# Patient Record
Sex: FEMALE | Race: WHITE | NOT HISPANIC OR LATINO | Employment: FULL TIME | URBAN - METROPOLITAN AREA
[De-identification: names, ages, dates, MRNs, and addresses within clinical notes are randomized per-mention and may not be internally consistent; named-entity substitution may affect disease eponyms.]

---

## 2018-07-02 ENCOUNTER — OFFICE VISIT (OUTPATIENT)
Dept: UROLOGY | Facility: CLINIC | Age: 28
End: 2018-07-02
Payer: MEDICAID

## 2018-07-02 ENCOUNTER — TELEPHONE (OUTPATIENT)
Dept: UROLOGY | Facility: CLINIC | Age: 28
End: 2018-07-02

## 2018-07-02 DIAGNOSIS — R10.9 BILATERAL FLANK PAIN: ICD-10-CM

## 2018-07-02 DIAGNOSIS — R10.2 PELVIC PAIN IN FEMALE: ICD-10-CM

## 2018-07-02 DIAGNOSIS — E83.59 NEPHROCALCINOSIS: ICD-10-CM

## 2018-07-02 DIAGNOSIS — N20.0 KIDNEY STONES: Primary | ICD-10-CM

## 2018-07-02 DIAGNOSIS — N29 NEPHROCALCINOSIS: ICD-10-CM

## 2018-07-02 LAB
BILIRUB SERPL-MCNC: NORMAL MG/DL
BLOOD URINE, POC: 5
COLOR, POC UA: YELLOW
GLUCOSE UR QL STRIP: NORMAL
KETONES UR QL STRIP: NORMAL
LEUKOCYTE ESTERASE URINE, POC: NORMAL
NITRITE, POC UA: NORMAL
PH, POC UA: 6
POC RESIDUAL URINE VOLUME: 0 ML (ref 0–100)
PROTEIN, POC: NORMAL
SPECIFIC GRAVITY, POC UA: 1005
UROBILINOGEN, POC UA: NORMAL

## 2018-07-02 PROCEDURE — 99213 OFFICE O/P EST LOW 20 MIN: CPT | Mod: PBBFAC,25 | Performed by: NURSE PRACTITIONER

## 2018-07-02 PROCEDURE — 51798 US URINE CAPACITY MEASURE: CPT | Mod: PBBFAC | Performed by: NURSE PRACTITIONER

## 2018-07-02 PROCEDURE — 99214 OFFICE O/P EST MOD 30 MIN: CPT | Mod: S$PBB,,, | Performed by: NURSE PRACTITIONER

## 2018-07-02 PROCEDURE — 99999 PR PBB SHADOW E&M-EST. PATIENT-LVL III: CPT | Mod: PBBFAC,,, | Performed by: NURSE PRACTITIONER

## 2018-07-02 PROCEDURE — 87086 URINE CULTURE/COLONY COUNT: CPT

## 2018-07-02 PROCEDURE — 81001 URINALYSIS AUTO W/SCOPE: CPT | Mod: PBBFAC | Performed by: NURSE PRACTITIONER

## 2018-07-02 RX ORDER — OXYCODONE AND ACETAMINOPHEN 5; 325 MG/1; MG/1
1 TABLET ORAL EVERY 6 HOURS PRN
Qty: 15 TABLET | Refills: 0 | Status: SHIPPED | OUTPATIENT
Start: 2018-07-02 | End: 2022-02-02

## 2018-07-02 NOTE — TELEPHONE ENCOUNTER
----- Message from Sari Ayala sent at 7/2/2018  1:12 PM CDT -----  Contact: [phone]  Pt is requesting to speak to you , pt would not say what this is regarding . Pls call pt [phone]. Thanks.......Sapna

## 2018-07-02 NOTE — PROGRESS NOTES
Subjective:       Patient ID: Eli Car is a 28 y.o. female who is an established patient of Dr. Jarvis though new to me was last seen in this office 6/7/16    Chief Complaint:   No chief complaint on file.    Bilateral flank pain  Patient complains of bilateral flank pain with radiation to the abdomen. Onset of symptoms was abrupt starting 2 weeks ago with gradually worsening course since that time. Patient describes the pain as sharp and stabbing, continuous and rated as moderate. Pain is usually worse at night and aggravated with increased movement. Pain alleviated by decrease in activity. She also reports suprapubic pressure only with urination. Denies straining to void. She reports issues with constipation--currently taking Linzess as needed. The patient has had no nausea/no vomiting and no diaphoresis. There has been no fever or chills. The patient is not complaining of dysuria, gross hematuria, or frequency. Risk factors for urolithiasis: history of stone disease.    She has history of kidney stones. She has had ESWL in 2002 and left ureteroscopy in 2015. She has medullary nephrocalcinosis. Previously prescribed Urocit-K--self stopped this medication     PVR (bladder scan) today - 0 ml    ACTIVE MEDICAL ISSUES:  Patient Active Problem List   Diagnosis    Body mass index between 19-24, adult    Low back pain    Abnormal urinalysis    Sciatica    Pelvic pain in female    Calculus of kidney    Nephrocalcinosis       ALLERGIES AND MEDICATIONS: updated and reviewed.  Review of patient's allergies indicates:   Allergen Reactions    Adhesive Itching and Swelling     Causes itching and redness    Demerol [meperidine] Itching     Current Outpatient Prescriptions   Medication Sig    oxyCODONE-acetaminophen (PERCOCET) 5-325 mg per tablet Take 1 tablet by mouth every 6 (six) hours as needed for Pain.    potassium citrate (UROCIT-K) 10 mEq (1,080 mg) TbSR Take 1 tablet (10 mEq total) by mouth 3 (three)  times daily with meals.     No current facility-administered medications for this visit.        Review of Systems   Constitutional: Negative for activity change, chills, fatigue, fever and unexpected weight change.   Eyes: Negative for discharge, redness and visual disturbance.   Respiratory: Negative for cough, shortness of breath and wheezing.    Cardiovascular: Negative for chest pain and leg swelling.   Gastrointestinal: Negative for abdominal distention, abdominal pain, constipation, diarrhea, nausea and vomiting.   Genitourinary: Positive for flank pain (bilateral) and pelvic pain. Negative for decreased urine volume, difficulty urinating, dysuria, frequency, hematuria, urgency, vaginal bleeding and vaginal discharge.   Musculoskeletal: Negative for arthralgias, joint swelling and myalgias.   Skin: Negative for color change and rash.   Neurological: Negative for dizziness and light-headedness.   Psychiatric/Behavioral: Negative for behavioral problems and confusion. The patient is not nervous/anxious.        Objective:      There were no vitals filed for this visit.  Physical Exam   Constitutional: She is oriented to person, place, and time. She appears well-developed.   HENT:   Head: Normocephalic and atraumatic.   Nose: Nose normal.   Eyes: Conjunctivae are normal. Right eye exhibits no discharge. Left eye exhibits no discharge.   Neck: Normal range of motion. Neck supple. No tracheal deviation present. No thyromegaly present.   Cardiovascular: Normal rate and regular rhythm.    Pulmonary/Chest: Effort normal. No respiratory distress. She has no wheezes.   Abdominal: Soft. She exhibits no distension. There is no hepatosplenomegaly. There is no tenderness. There is CVA tenderness (minimal bilateral). No hernia.   Genitourinary:   Genitourinary Comments: Patient declined exam   Musculoskeletal: Normal range of motion. She exhibits no edema.   Neurological: She is alert and oriented to person, place, and time.    Skin: Skin is warm and dry. No rash noted. No erythema.     Psychiatric: She has a normal mood and affect. Her behavior is normal. Judgment normal.       Urine dipstick shows ++LE, trace protein,5-10 RBCs .      Assessment:       1. Kidney stones    2. Nephrocalcinosis    3. Bilateral flank pain    4. Pelvic pain in female          Plan:       1. Kidney stones  -s/p ESWL in 2002 and L URS in 2015  -Now with bilateral flank pain  -Will get CT renal stone study  - POCT urinalysis, dipstick or tablet reag    2. Nephrocalcinosis  -Previously on Urocit K--self stopped medication  -Recommend resume Urocit K--declined at this time    3. Bilateral flank pain  - Urine culture  - CT Renal Stone Study ABD Pelvis WO; Future  - oxyCODONE-acetaminophen (PERCOCET) 5-325 mg per tablet; Take 1 tablet by mouth every 6 (six) hours as needed for Pain.  Dispense: 15 tablet; Refill: 0    4. Pelvic pain in female  -Acceptable PVR  - POCT Bladder Scan  - Urine culture            Follow-up for 7-10 days to review CT scan.

## 2018-07-03 ENCOUNTER — HOSPITAL ENCOUNTER (OUTPATIENT)
Dept: RADIOLOGY | Facility: HOSPITAL | Age: 28
Discharge: HOME | End: 2018-07-03
Attending: NURSE PRACTITIONER
Payer: MEDICAID

## 2018-07-03 DIAGNOSIS — R31.9 HEMATURIA, UNSPECIFIED TYPE: Primary | ICD-10-CM

## 2018-07-03 DIAGNOSIS — R10.9 BILATERAL FLANK PAIN: ICD-10-CM

## 2018-07-03 DIAGNOSIS — N20.0 KIDNEY STONES: Primary | ICD-10-CM

## 2018-07-03 PROCEDURE — 74176 CT ABD & PELVIS W/O CONTRAST: CPT | Mod: 26,,, | Performed by: RADIOLOGY

## 2018-07-03 PROCEDURE — 74176 CT ABD & PELVIS W/O CONTRAST: CPT | Mod: TC

## 2018-07-04 LAB — BACTERIA UR CULT: NORMAL

## 2018-07-05 ENCOUNTER — TELEPHONE (OUTPATIENT)
Dept: UROLOGY | Facility: CLINIC | Age: 28
End: 2018-07-05

## 2018-10-19 ENCOUNTER — HOSPITAL ENCOUNTER (EMERGENCY)
Facility: HOSPITAL | Age: 28
Discharge: HOME | End: 2018-10-19
Attending: EMERGENCY MEDICINE
Payer: MEDICAID

## 2018-10-19 VITALS
SYSTOLIC BLOOD PRESSURE: 115 MMHG | BODY MASS INDEX: 22.2 KG/M2 | HEART RATE: 85 BPM | OXYGEN SATURATION: 97 % | HEIGHT: 64 IN | DIASTOLIC BLOOD PRESSURE: 81 MMHG | WEIGHT: 130 LBS | TEMPERATURE: 99 F | RESPIRATION RATE: 16 BRPM

## 2018-10-19 DIAGNOSIS — M79.652 LEFT THIGH PAIN: ICD-10-CM

## 2018-10-19 DIAGNOSIS — S16.1XXA NECK STRAIN, INITIAL ENCOUNTER: ICD-10-CM

## 2018-10-19 DIAGNOSIS — R52 PAIN: ICD-10-CM

## 2018-10-19 DIAGNOSIS — M25.532 LEFT WRIST PAIN: ICD-10-CM

## 2018-10-19 DIAGNOSIS — S00.531A CONTUSION OF LIP, INITIAL ENCOUNTER: Primary | ICD-10-CM

## 2018-10-19 LAB
B-HCG UR QL: NEGATIVE
BACTERIA #/AREA URNS HPF: ABNORMAL /HPF
BILIRUB UR QL STRIP: NEGATIVE
CLARITY UR: CLEAR
COLOR UR: YELLOW
CTP QC/QA: YES
GLUCOSE UR QL STRIP: NEGATIVE
HGB UR QL STRIP: ABNORMAL
KETONES UR QL STRIP: NEGATIVE
LEUKOCYTE ESTERASE UR QL STRIP: ABNORMAL
MICROSCOPIC COMMENT: ABNORMAL
NITRITE UR QL STRIP: NEGATIVE
PH UR STRIP: 7 [PH] (ref 5–8)
PROT UR QL STRIP: NEGATIVE
RBC #/AREA URNS HPF: 0 /HPF (ref 0–4)
SP GR UR STRIP: 1 (ref 1–1.03)
SQUAMOUS #/AREA URNS HPF: 3 /HPF
URN SPEC COLLECT METH UR: ABNORMAL
UROBILINOGEN UR STRIP-ACNC: NEGATIVE EU/DL
WBC #/AREA URNS HPF: 8 /HPF (ref 0–5)

## 2018-10-19 PROCEDURE — 99284 EMERGENCY DEPT VISIT MOD MDM: CPT | Mod: 25

## 2018-10-19 PROCEDURE — 96372 THER/PROPH/DIAG INJ SC/IM: CPT

## 2018-10-19 PROCEDURE — 87086 URINE CULTURE/COLONY COUNT: CPT

## 2018-10-19 PROCEDURE — 63600175 PHARM REV CODE 636 W HCPCS: Performed by: PHYSICIAN ASSISTANT

## 2018-10-19 PROCEDURE — 81025 URINE PREGNANCY TEST: CPT | Performed by: NURSE PRACTITIONER

## 2018-10-19 PROCEDURE — 81000 URINALYSIS NONAUTO W/SCOPE: CPT

## 2018-10-19 RX ORDER — IBUPROFEN 200 MG
200 TABLET ORAL EVERY 6 HOURS PRN
COMMUNITY
End: 2018-11-02 | Stop reason: ALTCHOICE

## 2018-10-19 RX ORDER — IBUPROFEN 600 MG/1
600 TABLET ORAL EVERY 6 HOURS PRN
Qty: 20 TABLET | Refills: 0 | Status: SHIPPED | OUTPATIENT
Start: 2018-10-19 | End: 2018-11-02 | Stop reason: ALTCHOICE

## 2018-10-19 RX ORDER — KETOROLAC TROMETHAMINE 30 MG/ML
15 INJECTION, SOLUTION INTRAMUSCULAR; INTRAVENOUS
Status: COMPLETED | OUTPATIENT
Start: 2018-10-19 | End: 2018-10-19

## 2018-10-19 RX ADMIN — KETOROLAC TROMETHAMINE 15 MG: 30 INJECTION, SOLUTION INTRAMUSCULAR at 01:10

## 2018-10-19 NOTE — ED TRIAGE NOTES
C/o pain to her left hip and left wrist. Pt. Reports she hit her lip. Pt. Reports pain to her neck area. Denies any LOC. Denies any back pain. Pt. Is able to demonstrate ROM to her upper and lower body. Pt. Walks without any assistance.

## 2018-10-19 NOTE — ED PROVIDER NOTES
"Encounter Date: 10/19/2018    This is a SORT/MSE of a 28 y.o. female presenting to the ED with c/o left wrist, neck, and leg pain following a slip and fall 2 days ago. Also reports flank/side pain. Care will be transferred to an alternate provider when patient is roomed for a full evaluation and final disposition. STEPHANIE Su, FNP-C 10/19/2018 1:20 PM    SCRIBE #1 NOTE: INaila, anabella scribing for, and in the presence of,  Jesus Valverde PA-C. I have scribed the following portions of the note - Other sections scribed: HPI and ROS.       History     Chief Complaint   Patient presents with    Fall     Pt reports slipa nd fall Tuesday night at [employer]. Complains of left wrist pain, neck pain, left hip, and right flank pain. Denies LOC.      CC: Fall    HPI: This 28 y.o. Female, with a medical history of acid reflux and kidney stone, presents to the ED s/p a mechanical fall that occurred on Tuesday (10/16/18). Pt reports that during the fall she landed on her left side, hitting her lip, twisting her left wrist and breaking the fall with her left hip. She states that she has since been experiencing bruising to the lip (improving), left hip pain, left wrist pain ("pops" when twisting the wrist or turning a steering wheel) and neck stiffness (when turning the head to the right). Symptoms are acute, constant and moderate (5/10). Pt additionally notes experiencing a "pins and needle" sensation to the left foot last night, but notes that it has since resolved. Pt denies loss of consciousness, weakness and back pain. No other associated symptoms. No alleviating factors.           The history is provided by the patient. No  was used.     Review of patient's allergies indicates:   Allergen Reactions    Adhesive Itching and Swelling     Causes itching and redness    Demerol [meperidine] Itching     Past Medical History:   Diagnosis Date    Acid reflux     Constipation     Depression     " Kidney stone     Vaginal delivery     x2     Past Surgical History:   Procedure Laterality Date    APPENDECTOMY      CYSTOSCOPY, RETROGRADE, URETEROSCOPY, STENT PLACEMENT, laser lithotripsy,RUGGIERO CATHETER PLACEMENT Left 1/5/2015    [facility] at [facility]    KIDNEY STONE SURGERY      LAPAROSCOPY-DIAGNOSTIC N/A 6/19/2014    Performed by [facility]man [facility] at [facility] OR    OVARIAN CYST REMOVAL  2011    TONSILLECTOMY       Family History   Problem Relation Age of Onset    Breast cancer Maternal Grandmother      Social History     Tobacco Use    Smoking status: Never Smoker    Smokeless tobacco: Never Used   Substance Use Topics    Alcohol use: No    Drug use: No     Review of Systems   Constitutional: Negative for fever.   HENT: Negative for sore throat.         (+) bruising to left lip   Respiratory: Negative for shortness of breath.    Cardiovascular: Negative for chest pain.   Gastrointestinal: Negative for nausea.   Genitourinary: Negative for dysuria.   Musculoskeletal: Positive for arthralgias (left; left wrist) and neck stiffness. Negative for back pain.   Skin: Negative for rash.   Neurological: Negative for weakness.       Physical Exam     Initial Vitals [10/19/18 1320]   BP Pulse Resp Temp SpO2   115/81 85 16 98.9 °F (37.2 °C) 97 %      MAP       --         Physical Exam    Vitals reviewed.  Constitutional: She appears well-developed and well-nourished. She is not diaphoretic. No distress.   HENT:   Head: Normocephalic and atraumatic.   Right Ear: External ear normal.   Left Ear: External ear normal.   Nose: Nose normal.   Mouth/Throat: Oropharynx is clear and moist. No oropharyngeal exudate.   Eyes: Conjunctivae are normal. No scleral icterus.   Neck: Normal range of motion and full passive range of motion without pain. Neck supple. Muscular tenderness present. No spinous process tenderness present.   Cardiovascular: Normal rate, regular rhythm, normal heart sounds and intact  distal pulses.   Pulmonary/Chest: Breath sounds normal. No respiratory distress. She has no wheezes. She has no rhonchi. She has no rales. She exhibits no tenderness.   Abdominal: Soft. She exhibits no distension. There is no tenderness. There is no rebound, no guarding and no CVA tenderness.   Musculoskeletal: Normal range of motion. She exhibits tenderness.   Minimal tenderness to posterior neck, greatest on the right paraspinal region.  Mild tenderness to the ulnar aspect of the left wrist with no deformity or decreased range of motion. No tenderness of the left hip or knee.  Minimal tenderness to the lateral left thigh with no contusions or hematomas.  Patient ambulates with no limp.   Neurological: She is alert and oriented to person, place, and time.   Skin: Skin is warm and dry. No erythema.         ED Course   Procedures  Labs Reviewed   URINALYSIS, REFLEX TO URINE CULTURE - Abnormal; Notable for the following components:       Result Value    Occult Blood UA 1+ (*)     Leukocytes, UA 3+ (*)     All other components within normal limits    Narrative:     Preferred Collection Type->Urine, Clean Catch   URINALYSIS MICROSCOPIC - Abnormal; Notable for the following components:    WBC, UA 8 (*)     Bacteria, UA Moderate (*)     All other components within normal limits    Narrative:     Preferred Collection Type->Urine, Clean Catch   CULTURE, URINE   POCT URINE PREGNANCY          Imaging Results          X-Ray Wrist Complete Left (Final result)  Result time 10/19/18 14:20:37    Final result by Ti Rahman MD (10/19/18 14:20:37)                 Impression:      1. No acute displaced fracture or dislocation of the wrist.      Electronically signed by: Ti Rahman MD  Date:    10/19/2018  Time:    14:20             Narrative:    EXAMINATION:  XR WRIST COMPLETE 3 VIEWS LEFT    CLINICAL HISTORY:  Pain, unspecified    TECHNIQUE:  PA, lateral, and oblique views of the left wrist were  performed.    COMPARISON:  None    FINDINGS:  Three views.    No acute displaced fracture or dislocation of the wrist.  No radiopaque foreign body.                               X-Ray Cervical Spine AP And Lateral (Final result)  Result time 10/19/18 14:21:44    Final result by Ti Rahman MD (10/19/18 14:21:44)                 Impression:      1. No acute displaced fracture or dislocation of the cervical spine.      Electronically signed by: Ti Rahman MD  Date:    10/19/2018  Time:    14:21             Narrative:    EXAMINATION:  XR CERVICAL SPINE AP LATERAL    CLINICAL HISTORY:  Pain, unspecified    TECHNIQUE:  AP, lateral and open mouth views of the cervical spine were performed.    COMPARISON:  None.    FINDINGS:  Four views.    Lateral imaging demonstrates adequate alignment of the cervical spine without significant vertebral body height loss or disc space height loss.  The facet joints are aligned.  AP spinal alignment is grossly unremarkable.  The lung apices are grossly clear.  The odontoid is intact.  The lateral masses of C1 are in anatomic relationship with C2.  The airway is patent.                              X-Rays:   Independently Interpreted Readings:   Other Readings:  X-rays of C-spine and left wrist negative for fracture dislocation    Medical Decision Making:   ED Management:  28-year-old female with multiple complaints from mechanical fall 3 days ago.  No exam findings to suggest serious injury.  X-rays negative. Patient reports chronic right flank pain, with no urinary symptoms. UA without convincing evidence for infection given clinical picture.  Will send for culture and follow-up.  Patient overall well-appearing with no abnormality in vital signs. Treated with NSAID and discharged with follow-up instructions.            Scribe Attestation:   Scribe #1: I performed the above scribed service and the documentation accurately describes the services I performed. I attest to the  accuracy of the note.    Attending Attestation:           Physician Attestation for Scribe:  Physician Attestation Statement for Scribe #1: I, Jesus Valverde PA-C, reviewed documentation, as scribed by Naila Flanagan in my presence, and it is both accurate and complete.                    Clinical Impression:   The primary encounter diagnosis was Contusion of lip, initial encounter. Diagnoses of Pain, Neck strain, initial encounter, Left wrist pain, and Left thigh pain were also pertinent to this visit.                             Jesus Valverde PA-C  10/19/18 1442

## 2018-10-21 LAB — BACTERIA UR CULT: NORMAL

## 2018-11-02 ENCOUNTER — HOSPITAL ENCOUNTER (EMERGENCY)
Facility: HOSPITAL | Age: 28
Discharge: HOME | End: 2018-11-02
Attending: EMERGENCY MEDICINE
Payer: MEDICAID

## 2018-11-02 VITALS
HEART RATE: 88 BPM | RESPIRATION RATE: 20 BRPM | BODY MASS INDEX: 18.78 KG/M2 | HEIGHT: 64 IN | WEIGHT: 110 LBS | SYSTOLIC BLOOD PRESSURE: 117 MMHG | OXYGEN SATURATION: 97 % | DIASTOLIC BLOOD PRESSURE: 77 MMHG | TEMPERATURE: 99 F

## 2018-11-02 DIAGNOSIS — S62.609A CLOSED FRACTURE DISLOCATION OF DISTAL INTERPHALANGEAL (DIP) JOINT OF FINGER, INITIAL ENCOUNTER: Primary | ICD-10-CM

## 2018-11-02 LAB
B-HCG UR QL: NEGATIVE
CTP QC/QA: YES

## 2018-11-02 PROCEDURE — 26755 TREAT FINGER FRACTURE EACH: CPT

## 2018-11-02 PROCEDURE — 25000003 PHARM REV CODE 250: Performed by: NURSE PRACTITIONER

## 2018-11-02 PROCEDURE — 99283 EMERGENCY DEPT VISIT LOW MDM: CPT

## 2018-11-02 PROCEDURE — 81025 URINE PREGNANCY TEST: CPT | Performed by: PHYSICIAN ASSISTANT

## 2018-11-02 PROCEDURE — 29130 APPL FINGER SPLINT STATIC: CPT | Mod: F1

## 2018-11-02 RX ORDER — NAPROXEN 500 MG/1
500 TABLET ORAL 2 TIMES DAILY PRN
Qty: 15 TABLET | Refills: 0 | OUTPATIENT
Start: 2018-11-02 | End: 2021-12-23

## 2018-11-02 RX ORDER — LIDOCAINE HYDROCHLORIDE 20 MG/ML
10 INJECTION, SOLUTION INFILTRATION; PERINEURAL
Status: COMPLETED | OUTPATIENT
Start: 2018-11-02 | End: 2018-11-02

## 2018-11-02 RX ORDER — OXYCODONE AND ACETAMINOPHEN 5; 325 MG/1; MG/1
1 TABLET ORAL
Status: DISCONTINUED | OUTPATIENT
Start: 2018-11-02 | End: 2018-11-02

## 2018-11-02 RX ORDER — KETOROLAC TROMETHAMINE 30 MG/ML
30 INJECTION, SOLUTION INTRAMUSCULAR; INTRAVENOUS
Status: DISCONTINUED | OUTPATIENT
Start: 2018-11-02 | End: 2018-11-02 | Stop reason: HOSPADM

## 2018-11-02 RX ADMIN — LIDOCAINE HYDROCHLORIDE 10 ML: 20 INJECTION, SOLUTION INFILTRATION; PERINEURAL at 02:11
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
## 2018-11-02 NOTE — DISCHARGE INSTRUCTIONS
Follow-up with Orthopedics as discussed for further treatment.  Take pain medication as needed and only as prescribed.    If you would like to follow up with the [facility] for further care of your fracture, please call the [facility] Scheduling Department at [phone] during business hours.  Please let the  know you need a fracture follow-up appointment with Orthopedics, and you will be scheduled in the Orthopedic Clinic.  Please bring your original Emergency Department discharge papers with  you to the clinic appointment.

## 2018-11-02 NOTE — ED NOTES
Made pt. Aware of need for urine sample so that medication can be given. Pt. States she is not going to give urine. Question if we can use sample from urine that was given on her last visit. Pt. Made aware that a new sample is given. Pt. States she is not going to give urine sample. Provider made aware.  Pt. Also, reports she drove to ED and cannot get anyone to answer phone. Pt. Reports she cannot find someone to come to the ED to pick her up

## 2018-11-02 NOTE — ED TRIAGE NOTES
Patient presents to the ED via personal transportation with children. Patient reports pain to left 5th digit. Patient also reports numbness to to finger. Patient states that she does not want to explain how she injured the finger in front of her children.

## 2018-11-02 NOTE — ED NOTES
Pt refused to   Give urine for UPT.Stated that she was on birth control.Explained to pt that she would not be given any medicine without UPT in the chart.

## 2019-03-21 ENCOUNTER — HOSPITAL ENCOUNTER (EMERGENCY)
Facility: HOSPITAL | Age: 29
Discharge: HOME | End: 2019-03-21
Attending: EMERGENCY MEDICINE
Payer: MEDICAID

## 2019-03-21 VITALS
OXYGEN SATURATION: 99 % | TEMPERATURE: 99 F | DIASTOLIC BLOOD PRESSURE: 75 MMHG | HEIGHT: 64 IN | HEART RATE: 102 BPM | WEIGHT: 130 LBS | RESPIRATION RATE: 17 BRPM | BODY MASS INDEX: 22.2 KG/M2 | SYSTOLIC BLOOD PRESSURE: 118 MMHG

## 2019-03-21 DIAGNOSIS — R55 SYNCOPE: ICD-10-CM

## 2019-03-21 LAB
AMPHET+METHAMPHET UR QL: NEGATIVE
B-HCG UR QL: NEGATIVE
BARBITURATES UR QL SCN>200 NG/ML: NEGATIVE
BENZODIAZ UR QL SCN>200 NG/ML: NEGATIVE
BZE UR QL SCN: NEGATIVE
CANNABINOIDS UR QL SCN: NEGATIVE
CREAT UR-MCNC: 104.4 MG/DL
CTP QC/QA: YES
METHADONE UR QL SCN>300 NG/ML: NEGATIVE
OPIATES UR QL SCN: NEGATIVE
PCP UR QL SCN>25 NG/ML: NEGATIVE
TOXICOLOGY INFORMATION: NORMAL

## 2019-03-21 PROCEDURE — 25000003 PHARM REV CODE 250: Performed by: EMERGENCY MEDICINE

## 2019-03-21 PROCEDURE — 93005 ELECTROCARDIOGRAM TRACING: CPT

## 2019-03-21 PROCEDURE — 99284 EMERGENCY DEPT VISIT MOD MDM: CPT | Mod: 25

## 2019-03-21 PROCEDURE — 82962 GLUCOSE BLOOD TEST: CPT

## 2019-03-21 PROCEDURE — 81025 URINE PREGNANCY TEST: CPT | Performed by: EMERGENCY MEDICINE

## 2019-03-21 PROCEDURE — 93010 ELECTROCARDIOGRAM REPORT: CPT | Mod: ,,, | Performed by: INTERNAL MEDICINE

## 2019-03-21 PROCEDURE — 80307 DRUG TEST PRSMV CHEM ANLYZR: CPT

## 2019-03-21 PROCEDURE — 93010 EKG 12-LEAD: ICD-10-PCS | Mod: ,,, | Performed by: INTERNAL MEDICINE

## 2019-03-21 RX ORDER — BUTALBITAL, ACETAMINOPHEN AND CAFFEINE 50; 325; 40 MG/1; MG/1; MG/1
1 TABLET ORAL
Status: COMPLETED | OUTPATIENT
Start: 2019-03-21 | End: 2019-03-21

## 2019-03-21 RX ORDER — ONDANSETRON 8 MG/1
8 TABLET, ORALLY DISINTEGRATING ORAL
Status: DISCONTINUED | OUTPATIENT
Start: 2019-03-21 | End: 2019-03-21

## 2019-03-21 RX ADMIN — BUTALBITAL, ACETAMINOPHEN AND CAFFEINE 1 TABLET: 50; 325; 40 TABLET ORAL at 03:03

## 2019-03-21 NOTE — ED PROVIDER NOTES
Encounter Date: 3/21/2019    SCRIBE #1 NOTE: I, Lilly Nicole, am scribing for, and in the presence of,  Cristal Hairston MD. I have scribed the following portions of the note - Other sections scribed: HPI, ROS, PE.       History     Chief Complaint   Patient presents with    Head Injury     Pt had syncope yesterday hitting back of head.  did not seek medical attn.  today woke up with headache and vomiting.   Went to Urgent Care, they sent here for further eval.     Pt presents with HA all over, nausea and vomiting.  denies diarrhea or fever.  Pt A&O x 4.     CC: Head Injury    HPI: This is a 28 y.o. F who has Depression, and Acid reflux who presents to the ED via EMS transportation for emergent evaluation of headache to the posterior aspect of the head after falling and hitting the back of her head during a syncopal episode yesterday. Pt reports chills and sweaty feet as she was at the refrigerator prior to the syncopal episode yesterday. Pt has associated right sided post neck pain, nausea, vomiting. She was evaluated at Urgent care PTA, in which she was administered Zofran by mouth and was unable to tolerate the medication. EMS personnel administered Zofran by IV en route. Pt does not have a Hx of similar problem, nor does she have a Hx of seizures. Additionally, the pt has the Nexplanon, which is scheduled for removal next month. Pt is not breastfeeding. Pt denies gait problem, visual disturbance, or tobacco use.      The history is provided by the patient. No  was used.     Review of patient's allergies indicates:   Allergen Reactions    Adhesive Itching and Swelling     Causes itching and redness    Demerol [meperidine] Itching     Past Medical History:   Diagnosis Date    Acid reflux     Constipation     Depression     Kidney stone     Vaginal delivery     x2     Past Surgical History:   Procedure Laterality Date    APPENDECTOMY      CYSTOSCOPY, RETROGRADE, URETEROSCOPY, STENT  PLACEMENT, laser lithotripsy,RUGGIERO CATHETER PLACEMENT Left 1/5/2015    Performed by SILVA Jarvis MD at [facility] OR    KIDNEY STONE SURGERY      LAPAROSCOPY-DIAGNOSTIC N/A 6/19/2014    Performed by Joshua Eastman MD at [facility] OR    OVARIAN CYST REMOVAL  2011    TONSILLECTOMY       Family History   Problem Relation Age of Onset    Breast cancer Maternal Grandmother      Social History     Tobacco Use    Smoking status: Never Smoker    Smokeless tobacco: Never Used   Substance Use Topics    Alcohol use: No    Drug use: No     Review of Systems   Constitutional: Negative for chills and fever.   HENT: Negative for ear pain and sore throat.    Eyes: Positive for photophobia. Negative for pain and visual disturbance.   Respiratory: Negative for cough and shortness of breath.    Cardiovascular: Negative for chest pain.   Gastrointestinal: Positive for nausea and vomiting. Negative for abdominal pain and diarrhea.   Genitourinary: Negative for dysuria.   Musculoskeletal: Positive for neck pain (right sided). Negative for back pain and gait problem.   Skin: Negative for rash.   Neurological: Positive for syncope and headaches.       Physical Exam     Initial Vitals [03/21/19 1336]   BP Pulse Resp Temp SpO2   126/66 86 18 98.5 °F (36.9 °C) 99 %      MAP       --         Physical Exam    Nursing note and vitals reviewed.  Constitutional: She appears well-developed and well-nourished. She is not diaphoretic. No distress.   HENT:   Head: Normocephalic and atraumatic.   Eyes: EOM are normal.   Neck: Neck supple.   (+) Pain with ROM on the right side of the neck   Cardiovascular: Normal rate and regular rhythm.   Pulmonary/Chest: Breath sounds normal. No respiratory distress. She has no wheezes. She has no rhonchi. She has no rales. She exhibits no tenderness.   Abdominal: Soft. Normal appearance and bowel sounds are normal. She exhibits no distension. There is no tenderness. There is no rebound and no guarding.    Musculoskeletal: Normal range of motion. She exhibits no edema.   Neurological: She is alert and oriented to person, place, and time. She has normal strength. No cranial nerve deficit or sensory deficit. GCS score is 15. GCS eye subscore is 4. GCS verbal subscore is 5. GCS motor subscore is 6.   Cranial nerves 2-12 intact. Motor strength sensation intact all extremities. Normal finger-to-nose.  Normal heel-to-shin.   Skin: Skin is warm and dry.   Psychiatric: She has a normal mood and affect.         ED Course   Procedures  Labs Reviewed   DRUG SCREEN PANEL, URINE EMERGENCY   POCT URINE PREGNANCY   POCT GLUCOSE MONITORING CONTINUOUS     EKG Readings: (Independently Interpreted)   Normal sinus rhythm. No hocm brugada WPW or prolonged QT.  No stemi       Imaging Results    None                     Scribe Attestation:   Scribe #1: I performed the above scribed service and the documentation accurately describes the services I performed. I attest to the accuracy of the note.    Attending Attestation:   Physician Attestation Statement for Resident:  As the supervising MD . -: Patient is here for syncope yesterday.  Likely vasovagal.  No hocm brugada WPW or prolonged QT on EKG.  Not pregnant.  Normal Accu-Chek.  Normal neurological exam.  Normal pupils.  No evidence of head trauma.  CT is pending.  Patient is not pregnant.  Perc and wells are zero, nexplanon in arm (no estrogen) ; no sob or cp         Physician Attestation for Scribe:  Physician Attestation Statement for Scribe #1: I, Cristal Hairston MD, reviewed documentation, as scribed by Lilly Nicole in my presence, and it is both accurate and complete.         Attending ED Notes:   CT unremarkable. Normal neurological exam.  Normal gait.  No signs of head injury. No chest pain or shortness of breath.  EKG unremarkable.  Will discharge this patient.  Tolerating p.o. time of discharge. She has no complaints remaining.  Tylenol for headache and pain.  She will  follow up outpatient.  Patient understands when to return that she should follow up as an outpatient.  Hr 81 at dc  No co at dc  Pt maximo po  Asking to go home  Non focal neuro exam  Eating crackers  rpts sx free at dc  ncat             Clinical Impression: head pain       ICD-10-CM ICD-9-CM   1. Syncope R55 780.2                                Cristal Hairston MD  03/21/19 1459       Cristal Hairston MD  03/21/19 1538       Cristal Hairston MD  03/21/19 1600       Cristal Hairston MD  03/21/19 1600

## 2019-03-21 NOTE — ED NOTES
Pt offered to be offered nausea medication before being discharged and refused to receive any medication. Pt requests to go home

## 2019-03-22 LAB — POCT GLUCOSE: 90 MG/DL (ref 70–110)

## 2019-03-22 NOTE — ED TRIAGE NOTES
Pt arrived to the due to a fall that occurred yesterday. Pt reports that she is unsure of what caused her to fall but reports hitting her head during fall. Pt reports a headache, n/v, and an unsteady gait after her fall.

## 2020-05-20 ENCOUNTER — NURSE TRIAGE (OUTPATIENT)
Dept: ADMINISTRATIVE | Facility: CLINIC | Age: 30
End: 2020-05-20

## 2020-05-20 NOTE — TELEPHONE ENCOUNTER
Spoke with patient she states that her left ankle has been  Swollen for the last 3 days.  States that she does not remember injuring her left ankle.  Reports that she has 7/10 pain  when she appiles pressure on her ankle but states that she is still able to walk on it.  Ankle is painful to touch.  States that she has used ice and nothing has helped.  Advised patient to be seen within 3-4 hours.  Anywhere care declined.  Patient states that she will go to the urgent care.      Reason for Disposition   [1] Thigh, calf, or ankle swelling AND [2] only 1 side    Additional Information   Negative: Severe difficulty breathing (e.g., struggling for each breath, speaks in single words)   Negative: Looks like a broken bone or dislocated joint (e.g., crooked or deformed)   Negative: Sounds like a life-threatening emergency to the triager   Negative: Difficulty breathing at rest   Negative: Entire foot is cool or blue in comparison to other side   Negative: [1] Can't walk or can barely walk AND [2] new onset   Negative: [1] Difficulty breathing with exertion (e.g., walking) AND [2] new onset or worsening   Negative: [1] Red area or streak AND [2] fever   Negative: [1] Swelling is painful to touch AND [2] fever   Negative: [1] Cast on leg or ankle AND [2] now increased pain   Negative: Patient sounds very sick or weak to the triager   Negative: SEVERE leg swelling (e.g., swelling extends above knee, entire leg is swollen, weeping fluid)   Negative: [1] Red area or streak [2] large (> 2 in. or 5 cm)   Negative: [1] Thigh or calf pain AND [2] only 1 side AND [3] present > 1 hour    Protocols used: LEG SWELLING AND EDEMA-A-AH

## 2021-12-23 ENCOUNTER — HOSPITAL ENCOUNTER (EMERGENCY)
Facility: HOSPITAL | Age: 31
Discharge: HOME | End: 2021-12-23
Attending: EMERGENCY MEDICINE
Payer: MEDICAID

## 2021-12-23 VITALS
OXYGEN SATURATION: 97 % | TEMPERATURE: 98 F | SYSTOLIC BLOOD PRESSURE: 136 MMHG | HEART RATE: 101 BPM | DIASTOLIC BLOOD PRESSURE: 81 MMHG | RESPIRATION RATE: 16 BRPM

## 2021-12-23 DIAGNOSIS — H92.02 OTALGIA, LEFT EAR: Primary | ICD-10-CM

## 2021-12-23 PROCEDURE — 99284 EMERGENCY DEPT VISIT MOD MDM: CPT

## 2021-12-23 RX ORDER — FLUCONAZOLE 150 MG/1
150 TABLET ORAL ONCE
Qty: 1 TABLET | Refills: 0 | Status: SHIPPED | OUTPATIENT
Start: 2021-12-23 | End: 2021-12-23

## 2021-12-23 RX ORDER — AMOXICILLIN AND CLAVULANATE POTASSIUM 875; 125 MG/1; MG/1
1 TABLET, FILM COATED ORAL 2 TIMES DAILY
Qty: 20 TABLET | Refills: 0 | Status: SHIPPED | OUTPATIENT
Start: 2021-12-23 | End: 2022-02-02

## 2021-12-23 RX ORDER — ACETAMINOPHEN 500 MG
500 TABLET ORAL EVERY 6 HOURS PRN
Qty: 30 TABLET | Refills: 0 | Status: SHIPPED | OUTPATIENT
Start: 2021-12-23 | End: 2022-02-02

## 2021-12-23 RX ORDER — MELOXICAM 15 MG/1
15 TABLET ORAL DAILY
Qty: 30 TABLET | Refills: 0 | Status: SHIPPED | OUTPATIENT
Start: 2021-12-23 | End: 2022-02-02

## 2021-12-23 RX ORDER — FAMOTIDINE 20 MG/1
20 TABLET, FILM COATED ORAL 2 TIMES DAILY
Qty: 60 TABLET | Refills: 0 | Status: SHIPPED | OUTPATIENT
Start: 2021-12-23 | End: 2022-02-02

## 2021-12-23 NOTE — ED NOTES
Patient and family ready for discharge per MD orders.  D/c instructions reviewed with family, verbalize understanding. Encouraged to call MD with any questions or concerns. Aware of need to set follow up appt and verbalize understanding of all medications. Patient and family left at this time with all belongings, RX and discharge instructions to private transportation at this time

## 2021-12-23 NOTE — ED PROVIDER NOTES
"Encounter Date: 12/23/2021       History     Chief Complaint   Patient presents with    Otalgia     Patient stated that shes having pain in her left ear and "popping" in her ear x3-4 days. Started with a ringing sound and stated it feels swollen. No drainage. Tried OTC ear drops with no relief     31 y.o. female pmh notes that she had a recent cold which has resolved but has tinnitus as well as pain in her left ear she denies fevers chills nausea vomiting diarrhea dysuria she has not been swimming or underwater        Review of patient's allergies indicates:   Allergen Reactions    Adhesive Itching and Swelling     Causes itching and redness    Demerol [meperidine] Itching     Past Medical History:   Diagnosis Date    Acid reflux     Constipation     Depression     Kidney stone     Vaginal delivery     x2     Past Surgical History:   Procedure Laterality Date    APPENDECTOMY      KIDNEY STONE SURGERY      OVARIAN CYST REMOVAL  2011    TONSILLECTOMY       Family History   Problem Relation Age of Onset    Breast cancer Maternal Grandmother      Social History     Tobacco Use    Smoking status: Never Smoker    Smokeless tobacco: Never Used   Substance Use Topics    Alcohol use: No    Drug use: No     Review of Systems   Constitutional: Negative for fever.   HENT: Negative for sore throat.    Respiratory: Negative for shortness of breath.    Cardiovascular: Negative for chest pain.   Gastrointestinal: Negative for nausea.   Genitourinary: Negative for dysuria.   Musculoskeletal: Negative for back pain.   Skin: Negative for rash.   Neurological: Negative for weakness.   Hematological: Does not bruise/bleed easily.   All other systems reviewed and are negative.      Physical Exam     Initial Vitals [12/23/21 1412]   BP Pulse Resp Temp SpO2   136/81 101 16 98.2 °F (36.8 °C) 97 %      MAP       --         Physical Exam    Nursing note and vitals reviewed.  Constitutional: She appears well-developed and " well-nourished.   HENT:   Head: Normocephalic and atraumatic.   Eyes: Conjunctivae and EOM are normal.   Neck:   Normal range of motion.  Cardiovascular: Normal rate.   Pulmonary/Chest: No respiratory distress.   Abdominal: She exhibits no distension.   Musculoskeletal:         General: Normal range of motion.      Cervical back: Normal range of motion.     Neurological: She is alert. No cranial nerve deficit. GCS score is 15. GCS eye subscore is 4. GCS verbal subscore is 5. GCS motor subscore is 6.   Skin: Skin is warm and dry.   Psychiatric: She has a normal mood and affect. Thought content normal.     Left ear canal is very tight I do not see any obvious otitis externa TM is not thickened erythematous however she has point tenderness anteriorly in the external ear canal suspicious for a pimple    ED Course   Procedures  Labs Reviewed - No data to display       Imaging Results    None          Medications - No data to display                    I will start the patient on Augmentin Mobic Pepcid and Tylenol    She refuses to give a urine sample stating that there is no change she could be pregnant    Clinical Impression:   Final diagnoses:  [H92.02] Otalgia, left ear (Primary)          ED Disposition Condition    Discharge Stable        ED Prescriptions     Medication Sig Dispense Start Date End Date Auth. Provider    meloxicam (MOBIC) 15 MG tablet Take 1 tablet (15 mg total) by mouth once daily. 30 tablet 12/23/2021  Thuy Boyle MD    famotidine (PEPCID) 20 MG tablet Take 1 tablet (20 mg total) by mouth 2 (two) times daily. 60 tablet 12/23/2021 12/23/2022 Thuy Boyle MD    acetaminophen (TYLENOL) 500 MG tablet Take 1 tablet (500 mg total) by mouth every 6 (six) hours as needed for Pain. 30 tablet 12/23/2021  Thuy Boyle MD    amoxicillin-clavulanate 875-125mg (AUGMENTIN) 875-125 mg per tablet Take 1 tablet by mouth 2 (two) times daily. 20 tablet 12/23/2021  Thuy KAUR  MD [place]    fluconazole (DIFLUCAN) 150 MG Tab (Expires today) Take 1 tablet (150 mg total) by mouth once. for 1 dose 1 tablet 12/23/2021 12/23/2021 Thuy Boyle MD        Follow-up Information     Follow up With Specialties Details Why Contact Info    [facility]    [address] [place] [address]  [phone]      [facility]   [address] [place] [id number]  [phone]           Thuy Boyle MD  12/23/21 8844

## 2021-12-23 NOTE — DISCHARGE INSTRUCTIONS

## 2022-06-22 ENCOUNTER — OCCUPATIONAL HEALTH (OUTPATIENT)
Dept: URGENT CARE | Facility: CLINIC | Age: 32
End: 2022-06-22

## 2022-06-22 DIAGNOSIS — Z13.9 ENCOUNTER FOR SCREENING: Primary | ICD-10-CM

## 2022-06-22 LAB
CTP QC/QA: YES
POC 10 PANEL DRUG SCREEN: NEGATIVE

## 2022-06-22 PROCEDURE — 80305 DRUG TEST PRSMV DIR OPT OBS: CPT | Mod: S$GLB,,, | Performed by: PHYSICIAN ASSISTANT

## 2022-06-22 PROCEDURE — 80305 POCT RAPID DRUG SCREEN 10 PANEL: ICD-10-PCS | Mod: S$GLB,,, | Performed by: PHYSICIAN ASSISTANT

## 2025-02-16 ENCOUNTER — HOSPITAL ENCOUNTER (EMERGENCY)
Facility: HOSPITAL | Age: 35
Discharge: HOME | End: 2025-02-16
Attending: STUDENT IN AN ORGANIZED HEALTH CARE EDUCATION/TRAINING PROGRAM
Payer: MEDICAID

## 2025-02-16 VITALS
SYSTOLIC BLOOD PRESSURE: 114 MMHG | OXYGEN SATURATION: 98 % | TEMPERATURE: 98 F | WEIGHT: 150 LBS | DIASTOLIC BLOOD PRESSURE: 70 MMHG | HEIGHT: 63 IN | RESPIRATION RATE: 18 BRPM | BODY MASS INDEX: 26.58 KG/M2 | HEART RATE: 73 BPM

## 2025-02-16 DIAGNOSIS — N20.0 KIDNEY STONE ON RIGHT SIDE: Primary | ICD-10-CM

## 2025-02-16 DIAGNOSIS — N39.0 URINARY TRACT INFECTION WITHOUT HEMATURIA, SITE UNSPECIFIED: ICD-10-CM

## 2025-02-16 LAB
ALBUMIN SERPL BCP-MCNC: 4.3 G/DL (ref 3.5–5.2)
ALP SERPL-CCNC: 95 U/L (ref 40–150)
ALT SERPL W/O P-5'-P-CCNC: 22 U/L (ref 10–44)
ANION GAP SERPL CALC-SCNC: 9 MMOL/L (ref 8–16)
AST SERPL-CCNC: 16 U/L (ref 10–40)
B-HCG UR QL: NEGATIVE
BACTERIA #/AREA URNS HPF: ABNORMAL /HPF
BASOPHILS # BLD AUTO: 0.03 K/UL (ref 0–0.2)
BASOPHILS NFR BLD: 0.3 % (ref 0–1.9)
BILIRUB SERPL-MCNC: 1 MG/DL (ref 0.1–1)
BILIRUB UR QL STRIP: NEGATIVE
BUN SERPL-MCNC: 10 MG/DL (ref 6–20)
CALCIUM SERPL-MCNC: 9.5 MG/DL (ref 8.7–10.5)
CHLORIDE SERPL-SCNC: 105 MMOL/L (ref 95–110)
CLARITY UR: ABNORMAL
CO2 SERPL-SCNC: 26 MMOL/L (ref 23–29)
COLOR UR: YELLOW
CREAT SERPL-MCNC: 0.9 MG/DL (ref 0.5–1.4)
CTP QC/QA: YES
DIFFERENTIAL METHOD BLD: ABNORMAL
EOSINOPHIL # BLD AUTO: 0.1 K/UL (ref 0–0.5)
EOSINOPHIL NFR BLD: 0.7 % (ref 0–8)
ERYTHROCYTE [DISTWIDTH] IN BLOOD BY AUTOMATED COUNT: 12 % (ref 11.5–14.5)
EST. GFR  (NO RACE VARIABLE): >60 ML/MIN/1.73 M^2
GLUCOSE SERPL-MCNC: 95 MG/DL (ref 70–110)
GLUCOSE UR QL STRIP: NEGATIVE
HCT VFR BLD AUTO: 46.5 % (ref 37–48.5)
HGB BLD-MCNC: 15.5 G/DL (ref 12–16)
HGB UR QL STRIP: ABNORMAL
IMM GRANULOCYTES # BLD AUTO: 0.05 K/UL (ref 0–0.04)
IMM GRANULOCYTES NFR BLD AUTO: 0.5 % (ref 0–0.5)
KETONES UR QL STRIP: NEGATIVE
LEUKOCYTE ESTERASE UR QL STRIP: ABNORMAL
LIPASE SERPL-CCNC: 23 U/L (ref 4–60)
LYMPHOCYTES # BLD AUTO: 1.5 K/UL (ref 1–4.8)
LYMPHOCYTES NFR BLD: 14.1 % (ref 18–48)
MCH RBC QN AUTO: 28.5 PG (ref 27–31)
MCHC RBC AUTO-ENTMCNC: 33.3 G/DL (ref 32–36)
MCV RBC AUTO: 86 FL (ref 82–98)
MICROSCOPIC COMMENT: ABNORMAL
MONOCYTES # BLD AUTO: 0.5 K/UL (ref 0.3–1)
MONOCYTES NFR BLD: 4.5 % (ref 4–15)
NEUTROPHILS # BLD AUTO: 8.7 K/UL (ref 1.8–7.7)
NEUTROPHILS NFR BLD: 79.9 % (ref 38–73)
NITRITE UR QL STRIP: NEGATIVE
NRBC BLD-RTO: 0 /100 WBC
PH UR STRIP: 7 [PH] (ref 5–8)
PLATELET # BLD AUTO: 275 K/UL (ref 150–450)
PMV BLD AUTO: 10.5 FL (ref 9.2–12.9)
POTASSIUM SERPL-SCNC: 4 MMOL/L (ref 3.5–5.1)
PROT SERPL-MCNC: 7.7 G/DL (ref 6–8.4)
PROT UR QL STRIP: NEGATIVE
RBC # BLD AUTO: 5.43 M/UL (ref 4–5.4)
RBC #/AREA URNS HPF: >100 /HPF (ref 0–4)
SODIUM SERPL-SCNC: 140 MMOL/L (ref 136–145)
SP GR UR STRIP: 1.01 (ref 1–1.03)
SQUAMOUS #/AREA URNS HPF: 8 /HPF
URN SPEC COLLECT METH UR: ABNORMAL
UROBILINOGEN UR STRIP-ACNC: NEGATIVE EU/DL
WBC # BLD AUTO: 10.91 K/UL (ref 3.9–12.7)
WBC #/AREA URNS HPF: 34 /HPF (ref 0–5)

## 2025-02-16 PROCEDURE — 96375 TX/PRO/DX INJ NEW DRUG ADDON: CPT

## 2025-02-16 PROCEDURE — 63600175 PHARM REV CODE 636 W HCPCS

## 2025-02-16 PROCEDURE — 85025 COMPLETE CBC W/AUTO DIFF WBC: CPT

## 2025-02-16 PROCEDURE — 81025 URINE PREGNANCY TEST: CPT

## 2025-02-16 PROCEDURE — 81000 URINALYSIS NONAUTO W/SCOPE: CPT

## 2025-02-16 PROCEDURE — 83690 ASSAY OF LIPASE: CPT

## 2025-02-16 PROCEDURE — 99285 EMERGENCY DEPT VISIT HI MDM: CPT | Mod: 25

## 2025-02-16 PROCEDURE — 96374 THER/PROPH/DIAG INJ IV PUSH: CPT

## 2025-02-16 PROCEDURE — 80053 COMPREHEN METABOLIC PANEL: CPT

## 2025-02-16 PROCEDURE — 25000003 PHARM REV CODE 250

## 2025-02-16 PROCEDURE — 87086 URINE CULTURE/COLONY COUNT: CPT

## 2025-02-16 RX ORDER — TAMSULOSIN HYDROCHLORIDE 0.4 MG/1
0.4 CAPSULE ORAL DAILY
Qty: 10 CAPSULE | Refills: 0 | Status: SHIPPED | OUTPATIENT
Start: 2025-02-16 | End: 2026-02-16

## 2025-02-16 RX ORDER — TAMSULOSIN HYDROCHLORIDE 0.4 MG/1
0.4 CAPSULE ORAL
Status: COMPLETED | OUTPATIENT
Start: 2025-02-16 | End: 2025-02-16

## 2025-02-16 RX ORDER — KETOROLAC TROMETHAMINE 30 MG/ML
15 INJECTION, SOLUTION INTRAMUSCULAR; INTRAVENOUS
Status: COMPLETED | OUTPATIENT
Start: 2025-02-16 | End: 2025-02-16

## 2025-02-16 RX ORDER — CEPHALEXIN 500 MG/1
500 CAPSULE ORAL 4 TIMES DAILY
Qty: 20 CAPSULE | Refills: 0 | Status: SHIPPED | OUTPATIENT
Start: 2025-02-16 | End: 2025-02-17

## 2025-02-16 RX ORDER — IBUPROFEN 600 MG/1
600 TABLET ORAL EVERY 6 HOURS PRN
Qty: 20 TABLET | Refills: 0 | Status: SHIPPED | OUTPATIENT
Start: 2025-02-16

## 2025-02-16 RX ORDER — ONDANSETRON HYDROCHLORIDE 2 MG/ML
4 INJECTION, SOLUTION INTRAVENOUS
Status: COMPLETED | OUTPATIENT
Start: 2025-02-16 | End: 2025-02-16

## 2025-02-16 RX ORDER — MORPHINE SULFATE 4 MG/ML
4 INJECTION, SOLUTION INTRAMUSCULAR; INTRAVENOUS
Refills: 0 | Status: COMPLETED | OUTPATIENT
Start: 2025-02-16 | End: 2025-02-16

## 2025-02-16 RX ORDER — HYDROCODONE BITARTRATE AND ACETAMINOPHEN 5; 325 MG/1; MG/1
1 TABLET ORAL EVERY 6 HOURS PRN
Qty: 12 TABLET | Refills: 0 | Status: SHIPPED | OUTPATIENT
Start: 2025-02-16

## 2025-02-16 RX ORDER — CEFTRIAXONE 1 G/1
1 INJECTION, POWDER, FOR SOLUTION INTRAMUSCULAR; INTRAVENOUS
Status: COMPLETED | OUTPATIENT
Start: 2025-02-16 | End: 2025-02-16

## 2025-02-16 RX ADMIN — MORPHINE SULFATE 4 MG: 4 INJECTION, SOLUTION INTRAMUSCULAR; INTRAVENOUS at 03:02

## 2025-02-16 RX ADMIN — ONDANSETRON 4 MG: 2 INJECTION INTRAMUSCULAR; INTRAVENOUS at 02:02

## 2025-02-16 RX ADMIN — CEFTRIAXONE 1 G: 1 INJECTION, POWDER, FOR SOLUTION INTRAMUSCULAR; INTRAVENOUS at 03:02

## 2025-02-16 RX ADMIN — KETOROLAC TROMETHAMINE 15 MG: 30 INJECTION, SOLUTION INTRAMUSCULAR at 02:02

## 2025-02-16 RX ADMIN — TAMSULOSIN HYDROCHLORIDE 0.4 MG: 0.4 CAPSULE ORAL at 03:02

## 2025-02-16 NOTE — DISCHARGE INSTRUCTIONS
Strain all urine and collect stones to bring to Urology follow up appointment.  Take Tylenol or ibuprofen as directed for pain.  Complete antibiotics as directed.    Thank you for coming to our Emergency Department today. It is important to remember that some problems or medical conditions are difficult to diagnose and may not be found or addressed during your Emergency Department visit.  These conditions often start with non-specific symptoms and can only be diagnosed on follow up visits with your primary care physician or specialist when the symptoms continue or change. Please remember that all medical conditions can change, and we cannot predict how you will be feeling tomorrow or the next day. Return to the ER with any questions/concerns, new/concerning symptoms, worsening or failure to improve.       Be sure to follow up with your primary care doctor and review all labs/imaging/tests that were performed during your ER visit with them. It is very common for us to identify non-emergent incidental findings which must be followed up with your primary care physician.  Some labs/imaging/tests may be outside of the normal range, and require non-emergent follow-up and/or further investigation/treatment/procedures/testing to help diagnose/exclude/prevent complications or other potentially serious medical conditions. Some abnormalities may not have been discussed or addressed during your ER visit.     An ER visit does not replace a primary care visit, and many screening tests or follow-up tests cannot be ordered by an ER doctor or performed by the ER. Some tests may even require pre-approval.    If you do not have a primary care doctor, you may contact the one listed on your discharge paperwork or you may also call the [facility] Appointment Desk at [phone] , or [address] [place] [address] at  [phone] to schedule an appointment, or establish care with a primary care doctor or even a specialist and to obtain information  about local resources. It is important to your health that you have a primary care doctor.    Please take all medications as directed. We have done our best to select a medication for you that will treat your condition however, all medications may potentially have side-effects and it is impossible to predict which medications may give you side-effects or what those side-effects (if any) those medications may give you.  If you feel that you are having a negative effect or side-effect of any medication you should stop taking those medications immediately and seek medical attention. If you feel that you are having a life-threatening reaction call 911.        Do not drive, swim, climb to height, take a bath, operate heavy machinery, drink alcohol or take potentially sedating medications, sign any legal documents or make any important decisions for 24 hours if you have received any pain medications, sedatives or mood altering drugs during your ER visit or within 24 hours of taking them if they have been prescribed to you.     You can find additional resources for Dentists, hearing aids, durable medical equipment, low cost pharmacies and other resources at [email or web address]

## 2025-02-16 NOTE — ED PROVIDER NOTES
"Encounter Date: 2/16/2025    SCRIBE #1 NOTE: I, Brandon Villa Do, am scribing for, and in the presence of,  Génesis Dumont PA-C. I have scribed the following portions of the note - Other sections scribed: HPI, ROS, PE.       History     Chief Complaint   Patient presents with    Flank Pain     35 yo fem to triage for right sided flank pain that radiates to right lower abdomen. Pt has hx of ovarian cyst and kidney stones. VSS, AAOx4    Abdominal Pain     Eli Samuel is a 34 y.o. female, with a pertinent PMHx of GERD and kidney stones, who presents to the ED with right flank pain onset this morning. Patient reports eating a donut today with emesis following. She reports previous kidney stones, endorses her last was a "couple" years ago. She reports frequently drinking soft drinks, endorses drinking plenty of water today. Patient reports mild relief following a bowel movement. Patient reports a previous Nexplanon to her left upper extremity. She reports a recent menstrual period completion. She denies a history of HTN or diabetes. Patient reports attempted treatment with Advil about 1 hour ago and heat compressions without relief. No other exacerbating or alleviating factors. Patient denies difficulty urinating, dysuria, hematuria, fever, rhinorrhea, sore throat, chest pain, SOB, or other associated symptoms. Patient reports a previous appendectomy and right ovarian cyst. Patient is also requesting referral to a different OB/GYN.     The history is provided by the patient. No  was used.     Review of patient's allergies indicates:   Allergen Reactions    Adhesive Itching and Swelling     Causes itching and redness    Demerol [meperidine] Itching     Past Medical History:   Diagnosis Date    Acid reflux     Constipation     Depression     Kidney stone     Neuromuscular disorder     Vaginal delivery     x2     Past Surgical History:   Procedure Laterality Date    APPENDECTOMY      KIDNEY STONE SURGERY  "     OVARIAN CYST REMOVAL  2011    TONSILLECTOMY       Family History   Problem Relation Name Age of Onset    Breast cancer Maternal Grandmother       Social History[1]  Review of Systems   Constitutional:  Negative for fever.   HENT:  Negative for rhinorrhea and sore throat.    Respiratory:  Negative for shortness of breath.    Cardiovascular:  Negative for chest pain.   Gastrointestinal:  Positive for vomiting.   Genitourinary:  Positive for flank pain. Negative for difficulty urinating, dysuria and hematuria.       Physical Exam     Initial Vitals   BP Pulse Resp Temp SpO2   02/16/25 1355 02/16/25 1355 02/16/25 1355 02/16/25 1601 02/16/25 1355   (!) 155/98 88 18 98.3 °F (36.8 °C) 98 %      MAP       --                Physical Exam    Nursing note and vitals reviewed.  Constitutional: She appears well-developed and well-nourished. She is not diaphoretic. No distress.   HENT:   Head: Normocephalic and atraumatic.   Right Ear: External ear normal.   Left Ear: External ear normal.   Eyes: Conjunctivae and EOM are normal.   Neck: Neck supple.   Normal range of motion.  Cardiovascular:  Normal rate and regular rhythm.           Pulses:       Radial pulses are 2+ on the left side.   Pulmonary/Chest: Breath sounds normal. No stridor. No respiratory distress. She has no decreased breath sounds. She has no wheezes. She has no rhonchi. She has no rales.   Abdominal: There is abdominal tenderness in the right lower quadrant.   Right flank tenderness.    No right CVA tenderness.  No left CVA tenderness. There is no rebound and no guarding.   Musculoskeletal:         General: Normal range of motion.      Cervical back: Normal range of motion and neck supple.     Neurological: She is alert and oriented to person, place, and time.   Skin: No rash noted.   Psychiatric: She has a normal mood and affect. Thought content normal.         ED Course   Procedures  Labs Reviewed   URINALYSIS, REFLEX TO URINE CULTURE - Abnormal        Result Value    Specimen UA Urine, Clean Catch      Color, UA Yellow      Appearance, UA Hazy (*)     pH, UA 7.0      Specific Gravity, UA 1.015      Protein, UA Negative      Glucose, UA Negative      Ketones, UA Negative      Bilirubin (UA) Negative      Occult Blood UA 3+ (*)     Nitrite, UA Negative      Urobilinogen, UA Negative      Leukocytes, UA 3+ (*)     Narrative:     Specimen Source->Urine   CBC W/ AUTO DIFFERENTIAL - Abnormal    WBC 10.91      RBC 5.43 (*)     Hemoglobin 15.5      Hematocrit 46.5      MCV 86      MCH 28.5      MCHC 33.3      RDW 12.0      Platelets 275      MPV 10.5      Immature Granulocytes 0.5      Gran # (ANC) 8.7 (*)     Immature Grans (Abs) 0.05 (*)     Lymph # 1.5      Mono # 0.5      Eos # 0.1      Baso # 0.03      nRBC 0      Gran % 79.9 (*)     Lymph % 14.1 (*)     Mono % 4.5      Eosinophil % 0.7      Basophil % 0.3      Differential Method Automated     URINALYSIS MICROSCOPIC - Abnormal    RBC, UA >100 (*)     WBC, UA 34 (*)     Bacteria Few (*)     Squam Epithel, UA 8      Microscopic Comment SEE COMMENT      Narrative:     Specimen Source->Urine   CULTURE, URINE   COMPREHENSIVE METABOLIC PANEL    Sodium 140      Potassium 4.0      Chloride 105      CO2 26      Glucose 95      BUN 10      Creatinine 0.9      Calcium 9.5      Total Protein 7.7      Albumin 4.3      Total Bilirubin 1.0      Alkaline Phosphatase 95      AST 16      ALT 22      eGFR >60      Anion Gap 9     LIPASE    Lipase 23     POCT URINE PREGNANCY    POC Preg Test, Ur Negative       Acceptable Yes            Imaging Results               CT Renal Stone Study ABD Pelvis WO (Final result)  Result time 02/16/25 14:58:19      Final result by Ti Rahman MD (02/16/25 14:58:19)                   Impression:      This report was flagged in Epic as abnormal.    1. Moderate right hydroureteronephrosis noting right perinephric and periureteral inflammation secondary to 2 foci of calcification  within the distal ureter, largest measuring 5-6 mm.  Correlation with urinalysis recommended to exclude superimposed infection.  2. Existing changes of bilateral medullary nephrocalcinosis.  3. Hepatomegaly, correlation with LFTs recommended.  4. Please see above for several additional findings.      Electronically signed by: Ti Rahman MD  Date:    02/16/2025  Time:    14:58               Narrative:    EXAMINATION:  CT RENAL STONE STUDY ABD PELVIS WO    CLINICAL HISTORY:  Flank pain, kidney stone suspected;    TECHNIQUE:  Low dose axial images, sagittal and coronal reformations were obtained from the lung bases to the pubic symphysis.  Contrast was not administered.    COMPARISON:  CT 07/03/2018    FINDINGS:  Images of the lower thorax are unremarkable.    The liver is enlarged, correlation with LFTs recommended.  The spleen, pancreas, gallbladder and adrenal glands are grossly unremarkable for noncontrast appearance.  There is no biliary dilation or ascites.  The stomach is mildly distended with ingested content without wall thickening.  No significant abdominal lymphadenopathy.    There is bilateral nonobstructive nephrolithiasis noting medullary nephrocalcinosis.  There is a low attenuating lesion arising from the interpolar region of the right kidney measuring 1.2 cm, attenuation of which suggests cyst.  There is no left hydronephrosis.  The left ureter is unremarkable without calculi seen.  There is moderate right hydroureteronephrosis noting right perinephric and periureteral inflammation secondary to 2 foci of calcification within the distal ureter.  Proximally, the calcification measures 2-3 mm.  Distally, the calcification measures 5-6 mm.  The urinary bladder is decompressed without wall thickening.  The uterus and adnexa are grossly unremarkable noting dominant follicle or small cyst within the right ovary measuring 1.6 cm.  There is a small amount of fluid in the pelvis.    The large bowel is  grossly unremarkable.  The terminal ileum is unremarkable.  There is surgical change of appendectomy.  The small bowel is grossly unremarkable.  There are a few scattered shotty periaortic, pericaval, and mesenteric lymph nodes.  No focal organized pelvic fluid collection.    No acute osseous abnormalities.  Calcification noted within the right inguinal region.  No significant inguinal lymphadenopathy.                                       Medications   ondansetron injection 4 mg (4 mg Intravenous Given 2/16/25 1454)   ketorolac injection 15 mg (15 mg Intravenous Given 2/16/25 1454)   tamsulosin 24 hr capsule 0.4 mg (0.4 mg Oral Given 2/16/25 1558)   morphine injection 4 mg (4 mg Intravenous Given 2/16/25 1558)   cefTRIAXone injection 1 g (1 g Intravenous Given 2/16/25 1558)     Medical Decision Making  Eli Samuel is a 34 y.o. female, with a pertinent PMHx of GERD and kidney stones, who presents to the ED with right flank pain onset this morning.     Differential includes but not limited to nephrolithiasis, pyelonephritis, UTI, muscle spasm, diverticulitis however less likely due to lack of left lower quadrant pain.    Patient is alert and afebrile.  Patient is nontoxic appearing, not in distress.  Vitals within normal limits.  On physical exam patient ambulating without difficulty.  Lungs are clear to auscultation.  Patient is speaking in full sentences without difficulty.  Normal heart rate and rhythm.  No posterior oropharyngeal erythema, tonsillar swelling, tonsillar exudates.  Uvula is midline.  Abdomen is soft and nondistended.  Right lower quadrant abdominal tenderness noted.  Right flank tenderness present.  No rebound or guarding.  No signs of fluid overload.  No leg edema.  Strong distal radial pulse bilaterally.    CBC unremarkable for acute infection or anemia.  CMP remarkable for acute electrolyte abnormality, acute kidney or liver injury.  Lipase within normal limits thus less concerning for acute  pancreatitis. Urinalysis is remarkable for infection and blood.  CT abdomen pelvis reveals moderate right hydroureteronephrosis noting right perinephric and periureteral inflammation secondary to 2 nephrolithiasis noted without obstruction.  Proximal stone measures 2-3 mm, distal stone measures 5-6 mm.  1.2 cm lesion noted to the right kidney as well, suggesting cysts.  Patient given Toradol and morphine for pain. Flomax given for kidney stones. Patient given Rocephin for UTI and inflammation secondary to stones.  Upon re-evaluation patient states that her pain has been improved.  Patient is well-appearing.  Discussed with patient to take antibiotics at home until completion for urinary tract infection.  Discussed taking Tylenol and ibuprofen as directed for pain.  Discussed taking workup reports severe pain. Instructed to strain all urine Dulcolax stones to bring to follow up appointment with Urology.  Patient states she is established with Dr. oNlen, advised patient to follow up with him for further evaluation and definitive management of recurrent kidney stones.  Strict return precautions given for new or worsening symptoms such as but not limited to fever, persistent worsening pain, unable to urinate, or unable tolerate p.o. patient is in agreeance to this plan, expresses understanding of return precautions and follow up plan.  Last assessed vitals reassuring.  Thoroughly answered patient's questions and concerns.  Patient is stable for discharge at this time.    Amount and/or Complexity of Data Reviewed  Labs: ordered. Decision-making details documented in ED Course.  Radiology: ordered. Decision-making details documented in ED Course.    Risk  Prescription drug management.            Scribe Attestation:   Scribe #1: I performed the above scribed service and the documentation accurately describes the services I performed. I attest to the accuracy of the note.                               Clinical  Impression:  Final diagnoses:  [N39.0] Urinary tract infection without hematuria, site unspecified  [N20.0] Kidney stone on right side (Primary)       I, Génesis Dumont PA-C, personally performed the services described in this documentation. All medical record entries made by the scribe were at my direction and in my presence. I have reviewed the chart and agree that the record reflects my personal performance and is accurate and complete.      DISCLAIMER: This note was prepared with [employer] voice recognition transcription software. Garbled syntax, mangled pronouns, and other bizarre constructions may be attributed to that software system.     ED Disposition Condition    Discharge Stable          ED Prescriptions       Medication Sig Dispense Start Date End Date Auth. Provider    tamsulosin (FLOMAX) 0.4 mg Cap Take 1 capsule (0.4 mg total) by mouth once daily. 10 capsule 2/16/2025 2/16/2026 Génesis Dumont PA-C    ibuprofen (ADVIL,MOTRIN) 600 MG tablet Take 1 tablet (600 mg total) by mouth every 6 (six) hours as needed for Pain. 20 tablet 2/16/2025 -- Génesis Dumont PA-C    HYDROcodone-acetaminophen (NORCO) 5-325 mg per tablet Take 1 tablet by mouth every 6 (six) hours as needed for Pain. 12 tablet 2/16/2025 -- Génesis Dumont PA-C    cephALEXin (KEFLEX) 500 MG capsule Take 1 capsule (500 mg total) by mouth 4 (four) times daily. for 5 days 20 capsule 2/16/2025 2/21/2025 Génesis Dumont PA-C          Follow-up Information       Follow up With Specialties Details Why Contact Info    Lenny Jarvis MD Urology Schedule an appointment as soon as possible for a visit in 2 days For further evaluation and definitive management of kidney stones. [address]  [address]  [facility] [address]  [phone]      [facility] Emergency Medicine Go to  If new symptoms develop or symptoms worsen [address] [facility] [place] [address] [phone]     Your Primary Care Provider  Schedule an appointment as soon as possible for a visit in 2 days For follow up                  [1]   Social History  Tobacco Use    Smoking status: Never    Smokeless tobacco: Never   Substance Use Topics    Alcohol use: No    Drug use: No        Génesis Dumont PA-C  02/16/25 1719

## 2025-02-17 ENCOUNTER — OFFICE VISIT (OUTPATIENT)
Facility: CLINIC | Age: 35
End: 2025-02-17
Payer: MEDICAID

## 2025-02-17 ENCOUNTER — TELEPHONE (OUTPATIENT)
Dept: UROLOGY | Facility: CLINIC | Age: 35
End: 2025-02-17
Payer: MEDICAID

## 2025-02-17 ENCOUNTER — PATIENT MESSAGE (OUTPATIENT)
Dept: UROLOGY | Facility: CLINIC | Age: 35
End: 2025-02-17
Payer: MEDICAID

## 2025-02-17 VITALS
BODY MASS INDEX: 27.68 KG/M2 | OXYGEN SATURATION: 99 % | TEMPERATURE: 99 F | RESPIRATION RATE: 18 BRPM | HEART RATE: 80 BPM | DIASTOLIC BLOOD PRESSURE: 66 MMHG | HEIGHT: 63 IN | WEIGHT: 156.19 LBS | SYSTOLIC BLOOD PRESSURE: 98 MMHG

## 2025-02-17 DIAGNOSIS — N20.0 KIDNEY STONES: Primary | ICD-10-CM

## 2025-02-17 DIAGNOSIS — R11.2 NAUSEA AND VOMITING, UNSPECIFIED VOMITING TYPE: ICD-10-CM

## 2025-02-17 PROCEDURE — 99214 OFFICE O/P EST MOD 30 MIN: CPT | Mod: PBBFAC,PN | Performed by: STUDENT IN AN ORGANIZED HEALTH CARE EDUCATION/TRAINING PROGRAM

## 2025-02-17 RX ORDER — SULFAMETHOXAZOLE AND TRIMETHOPRIM 800; 160 MG/1; MG/1
1 TABLET ORAL 2 TIMES DAILY
Qty: 14 TABLET | Refills: 0 | Status: SHIPPED | OUTPATIENT
Start: 2025-02-17 | End: 2025-02-24

## 2025-02-17 RX ORDER — ONDANSETRON 4 MG/1
4 TABLET, ORALLY DISINTEGRATING ORAL 2 TIMES DAILY PRN
Qty: 30 TABLET | Refills: 0 | Status: SHIPPED | OUTPATIENT
Start: 2025-02-17 | End: 2026-02-17

## 2025-02-17 NOTE — PROGRESS NOTES
"SUBJECTIVE     HPI  Eli Samuel is a 34 y.o. female with  renal stones  that presents for follow-up after recent ED visit.    Records reviewed, pt in ED for kidney stone. Started flomax and keflex.     PAST MEDICAL HISTORY:  Past Medical History:   Diagnosis Date    Acid reflux     Constipation     Depression     Kidney stone     Neuromuscular disorder     Vaginal delivery     x2       ALLERGIES AND MEDICATIONS: updated and reviewed.  Review of patient's allergies indicates:   Allergen Reactions    Adhesive Itching and Swelling     Causes itching and redness    Demerol [meperidine] Itching     Current Medications[1]    ROS  Review of Systems   Constitutional:  Negative for chills, fatigue and fever.   HENT:  Negative for rhinorrhea and sore throat.    Respiratory:  Negative for cough and shortness of breath.    Cardiovascular:  Negative for chest pain and palpitations.   Gastrointestinal:  Negative for constipation, diarrhea, nausea and vomiting.   Genitourinary:  Positive for difficulty urinating, dysuria, flank pain and frequency.   Musculoskeletal:  Negative for joint swelling.   Skin:  Negative for rash and wound.   Neurological:  Negative for light-headedness and headaches.   Psychiatric/Behavioral:  Negative for dysphoric mood and sleep disturbance. The patient is not nervous/anxious.          OBJECTIVE     Physical Exam  Vitals:    02/17/25 1156   BP: 98/66   Pulse: 80   Resp: 18   Temp: 99.3 °F (37.4 °C)    Body mass index is 27.67 kg/m².  Weight: 70.8 kg (156 lb 3.1 oz)   Height: 5' 3" (160 cm)     Physical Exam  Vitals reviewed.   Constitutional:       General: She is not in acute distress.  HENT:      Right Ear: External ear normal.      Left Ear: External ear normal.      Nose: Nose normal.      Mouth/Throat:      Mouth: Mucous membranes are moist.   Eyes:      Extraocular Movements: Extraocular movements intact.      Conjunctiva/sclera: Conjunctivae normal.      Pupils: Pupils are equal, round, and " reactive to light.   Pulmonary:      Effort: Pulmonary effort is normal.   Abdominal:      General: There is no distension.      Palpations: Abdomen is soft.   Musculoskeletal:         General: No swelling. Normal range of motion.      Cervical back: Normal range of motion.   Skin:     General: Skin is warm and dry.      Findings: No rash.   Neurological:      General: No focal deficit present.      Mental Status: She is alert and oriented to person, place, and time.   Psychiatric:         Mood and Affect: Mood normal.         Behavior: Behavior normal.           Health Maintenance         Date Due Completion Date    Lipid Panel Never done ---    TETANUS VACCINE Never done ---    Cervical Cancer Screening 03/11/2023 3/11/2022    Influenza Vaccine (1) Never done ---    COVID-19 Vaccine (1 - 2024-25 season) Never done ---    RSV Vaccine (Age 60+ and Pregnant patients) (1 - 1-dose 75+ series) 04/04/2067 ---              ASSESSMENT     34 y.o. female with     1. Kidney stones    2. Nausea and vomiting, unspecified vomiting type        PLAN:     1. Kidney stones  - Not much improvement. Advised to continue to hydrate and take flomax. Will send referral for urology. Switch to bactrim.  - Ambulatory referral/consult to Urology; Future  - sulfamethoxazole-trimethoprim 800-160mg (BACTRIM DS) 800-160 mg Tab; Take 1 tablet by mouth 2 (two) times daily. for 7 days  Dispense: 14 tablet; Refill: 0    2. Nausea and vomiting   - Worsening, start zofran. F/u if not improved 3 days.  - ondansetron (ZOFRAN-ODT) 4 MG TbDL; Take 1 tablet (4 mg total) by mouth 2 (two) times daily as needed.  Dispense: 30 tablet; Refill: 0    Naomie Jamison MD  02/17/2025 11:52 AM        Follow up in about 2 months (around 4/17/2025).                     [1]   Current Outpatient Medications   Medication Sig Dispense Refill    HYDROcodone-acetaminophen (NORCO) 5-325 mg per tablet Take 1 tablet by mouth every 6 (six) hours as needed for Pain. 12 tablet 0     ibuprofen (ADVIL,MOTRIN) 600 MG tablet Take 1 tablet (600 mg total) by mouth every 6 (six) hours as needed for Pain. 20 tablet 0    ondansetron (ZOFRAN-ODT) 4 MG TbDL Take 1 tablet (4 mg total) by mouth 2 (two) times daily as needed. 30 tablet 0    sulfamethoxazole-trimethoprim 800-160mg (BACTRIM DS) 800-160 mg Tab Take 1 tablet by mouth 2 (two) times daily. for 7 days 14 tablet 0    tamsulosin (FLOMAX) 0.4 mg Cap Take 1 capsule (0.4 mg total) by mouth once daily. 10 capsule 0    potassium citrate (UROCIT-K) 10 mEq (1,080 mg) TbSR Take 1 tablet (10 mEq total) by mouth 3 (three) times daily with meals. 90 tablet 11     No current facility-administered medications for this visit.

## 2025-02-18 LAB — BACTERIA UR CULT: NORMAL

## 2025-02-25 ENCOUNTER — OFFICE VISIT (OUTPATIENT)
Dept: UROLOGY | Facility: CLINIC | Age: 35
End: 2025-02-25
Payer: MEDICAID

## 2025-02-25 VITALS — BODY MASS INDEX: 26.85 KG/M2 | WEIGHT: 151.56 LBS

## 2025-02-25 DIAGNOSIS — N29 NEPHROCALCINOSIS: ICD-10-CM

## 2025-02-25 DIAGNOSIS — E83.59 NEPHROCALCINOSIS: ICD-10-CM

## 2025-02-25 DIAGNOSIS — N20.0 KIDNEY STONES: Primary | ICD-10-CM

## 2025-02-25 PROCEDURE — 1159F MED LIST DOCD IN RCRD: CPT | Mod: CPTII,,, | Performed by: UROLOGY

## 2025-02-25 PROCEDURE — 99999 PR PBB SHADOW E&M-EST. PATIENT-LVL III: CPT | Mod: PBBFAC,,, | Performed by: UROLOGY

## 2025-02-25 PROCEDURE — 3008F BODY MASS INDEX DOCD: CPT | Mod: CPTII,,, | Performed by: UROLOGY

## 2025-02-25 PROCEDURE — 99213 OFFICE O/P EST LOW 20 MIN: CPT | Mod: PBBFAC | Performed by: UROLOGY

## 2025-02-25 PROCEDURE — 99204 OFFICE O/P NEW MOD 45 MIN: CPT | Mod: S$PBB,,, | Performed by: UROLOGY

## 2025-02-25 PROCEDURE — 1160F RVW MEDS BY RX/DR IN RCRD: CPT | Mod: CPTII,,, | Performed by: UROLOGY

## 2025-02-25 RX ORDER — POTASSIUM CITRATE 10 MEQ/1
10 TABLET, EXTENDED RELEASE ORAL
Qty: 90 TABLET | Refills: 11 | Status: SHIPPED | OUTPATIENT
Start: 2025-02-25 | End: 2026-02-25

## 2025-02-25 NOTE — PROGRESS NOTES
Subjective:       Patient ID: Eli Samuel is a 34 y.o. female who was referred by Naomie Jamison MD    Chief Complaint:   Chief Complaint   Patient presents with    Follow-up    Nephrolithiasis     Nephrocalcinosis  She ureteroscopy in 2015 She had an ESWL in 2002.  She used to take potassium citrate.      02/25/2025  She went to the ED recently with right sided pain.  She has since passed the stone.         ACTIVE MEDICAL ISSUES:  Problem List[1]    PAST MEDICAL HISTORY  Past Medical History:   Diagnosis Date    Acid reflux     Constipation     Depression     Kidney stone     Neuromuscular disorder     Vaginal delivery     x2       PAST SURGICAL HISTORY:  Past Surgical History:   Procedure Laterality Date    APPENDECTOMY      KIDNEY STONE SURGERY      OVARIAN CYST REMOVAL  2011    TONSILLECTOMY         SOCIAL HISTORY:  Social History[2]    FAMILY HISTORY:  Family History   Problem Relation Name Age of Onset    Breast cancer Maternal Grandmother         ALLERGIES AND MEDICATIONS: updated and reviewed.  Review of patient's allergies indicates:   Allergen Reactions    Adhesive Itching and Swelling     Causes itching and redness    Demerol [meperidine] Itching     Current Outpatient Medications   Medication Sig    ibuprofen (ADVIL,MOTRIN) 600 MG tablet Take 1 tablet (600 mg total) by mouth every 6 (six) hours as needed for Pain.    ondansetron (ZOFRAN-ODT) 4 MG TbDL Take 1 tablet (4 mg total) by mouth 2 (two) times daily as needed.    tamsulosin (FLOMAX) 0.4 mg Cap Take 1 capsule (0.4 mg total) by mouth once daily.    HYDROcodone-acetaminophen (NORCO) 5-325 mg per tablet Take 1 tablet by mouth every 6 (six) hours as needed for Pain. (Patient not taking: Reported on 2/25/2025)    potassium citrate (UROCIT-K) 10 mEq (1,080 mg) TbSR Take 1 tablet (10 mEq total) by mouth 3 (three) times daily with meals.     No current facility-administered medications for this visit.       Review of Systems   Constitutional:  Negative  for chills, fatigue and fever.   Respiratory:  Negative for chest tightness and shortness of breath.    Cardiovascular:  Negative for chest pain.   Gastrointestinal:  Negative for abdominal distention, constipation, nausea and vomiting.   Genitourinary:  Negative for difficulty urinating, dysuria, flank pain, frequency, hematuria and urgency.   Musculoskeletal:  Negative for arthralgias.   Neurological:  Negative for light-headedness.   Psychiatric/Behavioral:  Negative for confusion.        Objective:      Vitals:    02/25/25 1309   Weight: 68.8 kg (151 lb 9.1 oz)     Physical Exam  Vitals and nursing note reviewed.   Constitutional:       Appearance: She is well-developed.   HENT:      Head: Normocephalic.   Eyes:      Conjunctiva/sclera: Conjunctivae normal.   Neck:      Thyroid: No thyromegaly.      Trachea: No tracheal deviation.   Cardiovascular:      Rate and Rhythm: Normal rate.      Pulses: Normal pulses.      Heart sounds: Normal heart sounds.   Pulmonary:      Effort: Pulmonary effort is normal. No respiratory distress.      Breath sounds: Normal breath sounds. No wheezing.   Abdominal:      General: There is no distension.      Palpations: Abdomen is soft. There is no mass.      Tenderness: There is no abdominal tenderness. There is no guarding or rebound.      Hernia: No hernia is present.   Musculoskeletal:         General: No tenderness. Normal range of motion.      Cervical back: Normal range of motion.   Lymphadenopathy:      Cervical: No cervical adenopathy.   Skin:     General: Skin is warm and dry.      Findings: No erythema or rash.   Neurological:      Mental Status: She is alert and oriented to person, place, and time.   Psychiatric:         Behavior: Behavior normal.         Thought Content: Thought content normal.         Judgment: Judgment normal.         Urine dipstick shows positive for RBC's.  Micro exam: 1 RBC's per HPF.    CT Renal Stone Study ABD Pelvis WO  Order: [id number]   Status:  Final result       Next appt: Today at 01:15 PM in Urology (Lenny Jarvis MD)    Test Result Released: Yes (seen)    0 Result Notes  Details    Reading Physician Reading Date Result Priority   Ti Rahman MD  [phone]  2/16/2025 STAT     Narrative & Impression  EXAMINATION:  CT RENAL STONE STUDY ABD PELVIS WO     CLINICAL HISTORY:  Flank pain, kidney stone suspected;     TECHNIQUE:  Low dose axial images, sagittal and coronal reformations were obtained from the lung bases to the pubic symphysis.  Contrast was not administered.     COMPARISON:  CT 07/03/2018     FINDINGS:  Images of the lower thorax are unremarkable.     The liver is enlarged, correlation with LFTs recommended.  The spleen, pancreas, gallbladder and adrenal glands are grossly unremarkable for noncontrast appearance.  There is no biliary dilation or ascites.  The stomach is mildly distended with ingested content without wall thickening.  No significant abdominal lymphadenopathy.     There is bilateral nonobstructive nephrolithiasis noting medullary nephrocalcinosis.  There is a low attenuating lesion arising from the interpolar region of the right kidney measuring 1.2 cm, attenuation of which suggests cyst.  There is no left hydronephrosis.  The left ureter is unremarkable without calculi seen.  There is moderate right hydroureteronephrosis noting right perinephric and periureteral inflammation secondary to 2 foci of calcification within the distal ureter.  Proximally, the calcification measures 2-3 mm.  Distally, the calcification measures 5-6 mm.  The urinary bladder is decompressed without wall thickening.  The uterus and adnexa are grossly unremarkable noting dominant follicle or small cyst within the right ovary measuring 1.6 cm.  There is a small amount of fluid in the pelvis.     The large bowel is grossly unremarkable.  The terminal ileum is unremarkable.  There is surgical change of appendectomy.  The small bowel is  grossly unremarkable.  There are a few scattered shotty periaortic, pericaval, and mesenteric lymph nodes.  No focal organized pelvic fluid collection.     No acute osseous abnormalities.  Calcification noted within the right inguinal region.  No significant inguinal lymphadenopathy.     Impression:     This report was flagged in Epic as abnormal.     1. Moderate right hydroureteronephrosis noting right perinephric and periureteral inflammation secondary to 2 foci of calcification within the distal ureter, largest measuring 5-6 mm.  Correlation with urinalysis recommended to exclude superimposed infection.  2. Existing changes of bilateral medullary nephrocalcinosis.  3. Hepatomegaly, correlation with LFTs recommended.  4. Please see above for several additional findings.        Electronically signed by:Ti Rahman MD  Date:                                            02/16/2025  Time:                                           14:58        Exam Ended: 02/16/25 14:50 CST       Assessment:       1. Kidney stones    2. Nephrocalcinosis          Plan:       1. Kidney stones    - Ambulatory referral/consult to Urology  - US Retroperitoneal Complete; Future    2. Nephrocalcinosis    - potassium citrate (UROCIT-K) 10 mEq (1,080 mg) TbSR; Take 1 tablet (10 mEq total) by mouth 3 (three) times daily with meals.  Dispense: 90 tablet; Refill: 11  - Ambulatory referral/consult to Nephrology; Future            Follow up in about 2 weeks (around 3/11/2025) for Follow up Established, Review X-ray.         [1]   Patient Active Problem List  Diagnosis    Body mass index between 19-24, adult    Low back pain    Abnormal urinalysis    Sciatica    Pelvic pain in female    Calculus of kidney    Nephrocalcinosis   [2]   Social History  Tobacco Use    Smoking status: Never    Smokeless tobacco: Never   Substance Use Topics    Alcohol use: No    Drug use: No

## 2025-02-28 ENCOUNTER — HOSPITAL ENCOUNTER (OUTPATIENT)
Dept: RADIOLOGY | Facility: HOSPITAL | Age: 35
Discharge: HOME | End: 2025-02-28
Attending: UROLOGY
Payer: MEDICAID

## 2025-02-28 DIAGNOSIS — N20.0 KIDNEY STONES: ICD-10-CM

## 2025-02-28 PROCEDURE — 76770 US EXAM ABDO BACK WALL COMP: CPT | Mod: 26,,, | Performed by: RADIOLOGY

## 2025-02-28 PROCEDURE — 76770 US EXAM ABDO BACK WALL COMP: CPT | Mod: TC

## 2025-04-25 DIAGNOSIS — N29 NEPHROCALCINOSIS: Primary | ICD-10-CM

## 2025-04-25 DIAGNOSIS — E83.59 NEPHROCALCINOSIS: Primary | ICD-10-CM

## 2025-04-29 ENCOUNTER — PATIENT MESSAGE (OUTPATIENT)
Dept: NEPHROLOGY | Facility: CLINIC | Age: 35
End: 2025-04-29
Payer: MEDICAID

## 2025-05-02 ENCOUNTER — LAB VISIT (OUTPATIENT)
Dept: LAB | Facility: HOSPITAL | Age: 35
End: 2025-05-02
Attending: STUDENT IN AN ORGANIZED HEALTH CARE EDUCATION/TRAINING PROGRAM
Payer: MEDICAID

## 2025-05-02 DIAGNOSIS — N29 NEPHROCALCINOSIS: ICD-10-CM

## 2025-05-02 DIAGNOSIS — E83.59 NEPHROCALCINOSIS: ICD-10-CM

## 2025-05-02 LAB
BACTERIA #/AREA URNS AUTO: ABNORMAL /HPF
BILIRUB UR QL STRIP.AUTO: NEGATIVE
CLARITY UR: ABNORMAL
COLOR UR AUTO: YELLOW
CREAT UR-MCNC: 154.5 MG/DL (ref 15–325)
GLUCOSE UR QL STRIP: NEGATIVE
HGB UR QL STRIP: ABNORMAL
HYALINE CASTS UR QL AUTO: 1 /LPF (ref 0–1)
KETONES UR QL STRIP: NEGATIVE
LEUKOCYTE ESTERASE UR QL STRIP: ABNORMAL
MICROSCOPIC COMMENT: ABNORMAL
NITRITE UR QL STRIP: NEGATIVE
PH UR STRIP: 6 [PH]
PROT UR QL STRIP: NEGATIVE
PROT UR-MCNC: 11 MG/DL
PROT/CREAT UR: 0.07 MG/G{CREAT}
RBC #/AREA URNS AUTO: 2 /HPF (ref 0–4)
SP GR UR STRIP: 1.02
SQUAMOUS #/AREA URNS AUTO: 15 /HPF
UROBILINOGEN UR STRIP-ACNC: NEGATIVE EU/DL
WBC #/AREA URNS AUTO: 96 /HPF (ref 0–5)

## 2025-05-02 PROCEDURE — 82570 ASSAY OF URINE CREATININE: CPT

## 2025-05-02 PROCEDURE — 81001 URINALYSIS AUTO W/SCOPE: CPT

## 2025-05-05 ENCOUNTER — PATIENT MESSAGE (OUTPATIENT)
Dept: NEPHROLOGY | Facility: CLINIC | Age: 35
End: 2025-05-05
Payer: MEDICAID

## 2025-05-05 NOTE — PROGRESS NOTES
Subjective     Chief Complaint: nephrolithiasis    History of Present Illness:  Ms. Eli Samuel is a 35 y.o. female with active medical diagnosis of nephrolithiasis    4 episodes that have required hospitalization  Has never seen a kidney    Sister has kidney stones as well.   She states that she does not drink very much water but instead has been trying to change from coke/dark colored lamine to sprite and drink juice.   She thought she was following a low salt diet but admits to going to [email or web address] and thought that sea salt was not salt.   Her diet included chicken and shrimp most meals. Information regarding plant based diet given.     Review of Systems   Genitourinary:  Positive for flank pain.         ASSESSMENT & PLAN:     NSAID long-term use    Nephrocalcinosis  -     Ambulatory referral/consult to Nephrology  -     Supersaturation Profile, Timed Urine; Future  -     Renal Function Panel; Future; Expected date: 08/06/2025  -     Urinalysis; Future; Expected date: 08/06/2025  -     Protein/Creatinine Ratio, Urine; Future; Expected date: 08/06/2025    Right low back pain, unspecified chronicity, unspecified whether sciatica present        #CKD1/2  Baseline creatinine 0.9  UA pH 6.0, specific gravity 1.020, trace blood, 3+ leuks (15 squamous epithelial cells)  UPCR 0    Creatinine   Date Value Ref Range Status   05/02/2025 0.9 0.5 - 1.4 mg/dL Final   02/16/2025 0.9 0.5 - 1.4 mg/dL Final   01/21/2016 0.8 0.5 - 1.4 mg/dL Final   12/24/2015 0.9 0.5 - 1.4 mg/dL Final     eGFR   Date Value Ref Range Status   05/02/2025 >60 >60 mL/min/1.73/m2 Final     Comment:     Estimated GFR calculated using the CKD-EPI creatinine (2021) equation.   02/16/2025 >60 >60 mL/min/1.73 m^2 Final     Urine Protein/Creatinine Ratio   Date Value Ref Range Status   05/02/2025 0.07 <=0.20 Final       Imaging: CT renal stone 2/2024: moderate right hydroureteronephrosis with right perinephric and periureteral inflammation due to 2 foci.  Largest 5-6 mm). Bilateral medullary nephrocalcinosis    #Nephrolithiasis  Follows with urology Dr. Jarvis. Last seen 2/25/2025    24 hour urine study  We discussed general kidney stone prevention  Goal of 2.5 liters of UOP which means drinking at least that much  Low salt diet, less than 2 grams  Low animal protein diet. Information regarding plant based diet given.    For the pain she takes 600 mg of ibuprofen approx three time a week, once or twice a day. We discussed attempting to stay away from NSAIDs but she reports that tylenol makes her nauseated instead.     RTC in 3 months      PAST HISTORY:     Past Medical History:   Diagnosis Date    Acid reflux     Constipation     Depression     Kidney stone     Neuromuscular disorder     Vaginal delivery     x2       Past Surgical History:   Procedure Laterality Date    APPENDECTOMY      KIDNEY STONE SURGERY      OVARIAN CYST REMOVAL  2011    TONSILLECTOMY         Family History   Problem Relation Name Age of Onset    Breast cancer Maternal Grandmother         Social History     Socioeconomic History    Marital status:    Occupational History     Employer: [email or web address]    Occupation: vet clinic   Tobacco Use    Smoking status: Never    Smokeless tobacco: Never   Substance and Sexual Activity    Alcohol use: No    Drug use: No    Sexual activity: Yes     Partners: Male     Birth control/protection: Implant       MEDICATIONS & ALLERGIES:     Current Outpatient Medications on File Prior to Visit   Medication Sig    HYDROcodone-acetaminophen (NORCO) 5-325 mg per tablet Take 1 tablet by mouth every 6 (six) hours as needed for Pain. (Patient not taking: Reported on 2/25/2025)    ibuprofen (ADVIL,MOTRIN) 600 MG tablet Take 1 tablet (600 mg total) by mouth every 6 (six) hours as needed for Pain.    ondansetron (ZOFRAN-ODT) 4 MG TbDL Take 1 tablet (4 mg total) by mouth 2 (two) times daily as needed.    potassium citrate (UROCIT-K) 10 mEq (1,080 mg) TbSR Take  1 tablet (10 mEq total) by mouth 3 (three) times daily with meals.    tamsulosin (FLOMAX) 0.4 mg Cap Take 1 capsule (0.4 mg total) by mouth once daily.     No current facility-administered medications on file prior to visit.       Review of patient's allergies indicates:   Allergen Reactions    Adhesive Itching and Swelling     Causes itching and redness    Demerol [meperidine] Itching       OBJECTIVE:     Vital Signs:  There were no vitals filed for this visit.    There is no height or weight on file to calculate BMI.     Physical Exam  Constitutional:       General: She is not in acute distress.     Appearance: Normal appearance. She is not ill-appearing.   HENT:      Head: Normocephalic and atraumatic.      Mouth/Throat:      Pharynx: No oropharyngeal exudate or posterior oropharyngeal erythema.   Eyes:      General: No scleral icterus.        Right eye: No discharge.         Left eye: No discharge.      Extraocular Movements: Extraocular movements intact.      Conjunctiva/sclera: Conjunctivae normal.      Pupils: Pupils are equal, round, and reactive to light.   Neck:      Trachea: No tracheal deviation.   Cardiovascular:      Rate and Rhythm: Normal rate and regular rhythm.      Heart sounds: No murmur heard.  Pulmonary:      Effort: Pulmonary effort is normal. No respiratory distress.      Breath sounds: Normal breath sounds.   Abdominal:      General: Abdomen is flat. Bowel sounds are normal. There is no distension.      Palpations: Abdomen is soft. There is no mass.      Tenderness: There is no abdominal tenderness.      Comments: Right sided CVA tederness   Musculoskeletal:         General: No swelling, tenderness or deformity. Normal range of motion.      Cervical back: Neck supple.   Skin:     General: Skin is warm and dry.      Coloration: Skin is not jaundiced or pale.   Neurological:      General: No focal deficit present.      Mental Status: She is alert and oriented to person, place, and time. Mental  status is at baseline.         Laboratory  Sodium   Date Value Ref Range Status   05/02/2025 141 136 - 145 mmol/L Final   02/16/2025 140 136 - 145 mmol/L Final   01/21/2016 141 136 - 145 mmol/L Final     Potassium   Date Value Ref Range Status   05/02/2025 4.1 3.5 - 5.1 mmol/L Final   02/16/2025 4.0 3.5 - 5.1 mmol/L Final   01/21/2016 4.4 3.5 - 5.1 mmol/L Final     Chloride   Date Value Ref Range Status   05/02/2025 106 95 - 110 mmol/L Final   02/16/2025 105 95 - 110 mmol/L Final   01/21/2016 105 95 - 110 mmol/L Final     CO2   Date Value Ref Range Status   05/02/2025 26 23 - 29 mmol/L Final   02/16/2025 26 23 - 29 mmol/L Final   01/21/2016 28 23 - 29 mmol/L Final     BUN   Date Value Ref Range Status   05/02/2025 11 6 - 20 mg/dL Final   02/16/2025 10 6 - 20 mg/dL Final   01/21/2016 11 6 - 20 mg/dL Final     Creatinine   Date Value Ref Range Status   05/02/2025 0.9 0.5 - 1.4 mg/dL Final   02/16/2025 0.9 0.5 - 1.4 mg/dL Final   01/21/2016 0.8 0.5 - 1.4 mg/dL Final     eGFR   Date Value Ref Range Status   05/02/2025 >60 >60 mL/min/1.73/m2 Final     Comment:     Estimated GFR calculated using the CKD-EPI creatinine (2021) equation.   02/16/2025 >60 >60 mL/min/1.73 m^2 Final     Calcium   Date Value Ref Range Status   05/02/2025 9.7 8.7 - 10.5 mg/dL Final   02/16/2025 9.5 8.7 - 10.5 mg/dL Final   01/21/2016 10.0 8.7 - 10.5 mg/dL Final     Phosphorus Level   Date Value Ref Range Status   05/02/2025 3.2 2.7 - 4.5 mg/dL Final     Albumin   Date Value Ref Range Status   05/02/2025 4.3 3.5 - 5.2 g/dL Final   02/16/2025 4.3 3.5 - 5.2 g/dL Final   12/24/2015 4.7 3.5 - 5.2 g/dL Final       Diagnostic Results:      Health Maintenance Due   Topic Date Due    Lipid Panel  Never done    TETANUS VACCINE  08/27/2014    Cervical Cancer Screening  03/11/2023    COVID-19 Vaccine (1 - 2024-25 season) Never done         Visit today included increased complexity associated with the care of the episodic problem nephrolithiasis, NSAID use  addressed and managing the longitudinal care of the patient due to the serious and/or complex managed problem(s) CKD.    Vance Salazar MD  Nephrology [facility] [place]

## 2025-05-06 ENCOUNTER — OFFICE VISIT (OUTPATIENT)
Dept: NEPHROLOGY | Facility: CLINIC | Age: 35
End: 2025-05-06
Payer: MEDICAID

## 2025-05-06 VITALS
WEIGHT: 155.63 LBS | BODY MASS INDEX: 27.57 KG/M2 | HEIGHT: 63 IN | OXYGEN SATURATION: 97 % | DIASTOLIC BLOOD PRESSURE: 80 MMHG | RESPIRATION RATE: 18 BRPM | SYSTOLIC BLOOD PRESSURE: 118 MMHG | HEART RATE: 85 BPM

## 2025-05-06 DIAGNOSIS — N29 NEPHROCALCINOSIS: ICD-10-CM

## 2025-05-06 DIAGNOSIS — E83.59 NEPHROCALCINOSIS: ICD-10-CM

## 2025-05-06 DIAGNOSIS — Z79.1 NSAID LONG-TERM USE: Primary | ICD-10-CM

## 2025-05-06 DIAGNOSIS — M54.50 RIGHT LOW BACK PAIN, UNSPECIFIED CHRONICITY, UNSPECIFIED WHETHER SCIATICA PRESENT: ICD-10-CM

## 2025-05-06 PROCEDURE — 3066F NEPHROPATHY DOC TX: CPT | Mod: CPTII,,, | Performed by: STUDENT IN AN ORGANIZED HEALTH CARE EDUCATION/TRAINING PROGRAM

## 2025-05-06 PROCEDURE — 3008F BODY MASS INDEX DOCD: CPT | Mod: CPTII,,, | Performed by: STUDENT IN AN ORGANIZED HEALTH CARE EDUCATION/TRAINING PROGRAM

## 2025-05-06 PROCEDURE — 99999 PR PBB SHADOW E&M-EST. PATIENT-LVL IV: CPT | Mod: PBBFAC,,, | Performed by: STUDENT IN AN ORGANIZED HEALTH CARE EDUCATION/TRAINING PROGRAM

## 2025-05-06 PROCEDURE — 99213 OFFICE O/P EST LOW 20 MIN: CPT | Mod: S$PBB,,, | Performed by: STUDENT IN AN ORGANIZED HEALTH CARE EDUCATION/TRAINING PROGRAM

## 2025-05-06 PROCEDURE — 3074F SYST BP LT 130 MM HG: CPT | Mod: CPTII,,, | Performed by: STUDENT IN AN ORGANIZED HEALTH CARE EDUCATION/TRAINING PROGRAM

## 2025-05-06 PROCEDURE — G2211 COMPLEX E/M VISIT ADD ON: HCPCS | Mod: S$PBB,,, | Performed by: STUDENT IN AN ORGANIZED HEALTH CARE EDUCATION/TRAINING PROGRAM

## 2025-05-06 PROCEDURE — 1159F MED LIST DOCD IN RCRD: CPT | Mod: CPTII,,, | Performed by: STUDENT IN AN ORGANIZED HEALTH CARE EDUCATION/TRAINING PROGRAM

## 2025-05-06 PROCEDURE — 99214 OFFICE O/P EST MOD 30 MIN: CPT | Mod: PBBFAC | Performed by: STUDENT IN AN ORGANIZED HEALTH CARE EDUCATION/TRAINING PROGRAM

## 2025-05-06 PROCEDURE — 3079F DIAST BP 80-89 MM HG: CPT | Mod: CPTII,,, | Performed by: STUDENT IN AN ORGANIZED HEALTH CARE EDUCATION/TRAINING PROGRAM

## 2025-05-06 NOTE — PATIENT INSTRUCTIONS
I will see you back in 3 months    Remember for general kidney stone prevention:    Drink at least 2.5 liters of water a day, the goal is to urinate at least 2.5 liters  Low salt diet, the goal is less than 2 grams of salt. Any salt is still salt  Low animal protein diet, less chicken, shrimp. Other sources - peas, beans, chickpeas, tofu will help    Lets do the 24 hour urine study and I will see you back in 3 months

## 2025-05-08 ENCOUNTER — PATIENT MESSAGE (OUTPATIENT)
Dept: NEPHROLOGY | Facility: CLINIC | Age: 35
End: 2025-05-08
Payer: MEDICAID

## 2025-07-17 RX ORDER — IBUPROFEN 600 MG/1
600 TABLET, FILM COATED ORAL EVERY 6 HOURS PRN
Qty: 20 TABLET | Refills: 0 | Status: SHIPPED | OUTPATIENT
Start: 2025-07-17

## 2025-07-30 ENCOUNTER — TELEPHONE (OUTPATIENT)
Facility: CLINIC | Age: 35
End: 2025-07-30
Payer: MEDICAID

## 2025-07-30 RX ORDER — TAMSULOSIN HYDROCHLORIDE 0.4 MG/1
0.4 CAPSULE ORAL DAILY
Qty: 10 CAPSULE | Refills: 0 | Status: SHIPPED | OUTPATIENT
Start: 2025-07-30 | End: 2026-07-30

## 2025-08-07 ENCOUNTER — PATIENT MESSAGE (OUTPATIENT)
Dept: NEPHROLOGY | Facility: CLINIC | Age: 35
End: 2025-08-07
Payer: MEDICAID

## 2025-08-11 ENCOUNTER — OFFICE VISIT (OUTPATIENT)
Dept: OBSTETRICS AND GYNECOLOGY | Facility: CLINIC | Age: 35
End: 2025-08-11
Payer: MEDICAID

## 2025-08-11 VITALS
BODY MASS INDEX: 27.7 KG/M2 | HEIGHT: 63 IN | SYSTOLIC BLOOD PRESSURE: 120 MMHG | WEIGHT: 156.31 LBS | DIASTOLIC BLOOD PRESSURE: 82 MMHG

## 2025-08-11 DIAGNOSIS — Z30.46 NEXPLANON REMOVAL: Primary | ICD-10-CM

## 2025-08-11 DIAGNOSIS — R53.83 FATIGUE, UNSPECIFIED TYPE: ICD-10-CM

## 2025-08-11 PROCEDURE — 99999 PR PBB SHADOW E&M-EST. PATIENT-LVL III: CPT | Mod: PBBFAC,,, | Performed by: STUDENT IN AN ORGANIZED HEALTH CARE EDUCATION/TRAINING PROGRAM

## 2025-08-11 PROCEDURE — 99499 UNLISTED E&M SERVICE: CPT | Mod: S$PBB,,, | Performed by: STUDENT IN AN ORGANIZED HEALTH CARE EDUCATION/TRAINING PROGRAM

## 2025-08-11 PROCEDURE — 99213 OFFICE O/P EST LOW 20 MIN: CPT | Mod: PBBFAC,PN | Performed by: STUDENT IN AN ORGANIZED HEALTH CARE EDUCATION/TRAINING PROGRAM

## 2025-08-11 PROCEDURE — 11982 REMOVE DRUG IMPLANT DEVICE: CPT | Mod: PBBFAC,PN | Performed by: STUDENT IN AN ORGANIZED HEALTH CARE EDUCATION/TRAINING PROGRAM

## 2025-08-19 ENCOUNTER — LAB VISIT (OUTPATIENT)
Dept: LAB | Facility: HOSPITAL | Age: 35
End: 2025-08-19
Attending: STUDENT IN AN ORGANIZED HEALTH CARE EDUCATION/TRAINING PROGRAM
Payer: MEDICAID

## 2025-08-19 DIAGNOSIS — N29 NEPHROCALCINOSIS: ICD-10-CM

## 2025-08-19 DIAGNOSIS — E83.59 NEPHROCALCINOSIS: ICD-10-CM

## 2025-08-19 LAB
BACTERIA #/AREA URNS AUTO: ABNORMAL /HPF
BILIRUB UR QL STRIP.AUTO: NEGATIVE
CLARITY UR: ABNORMAL
COLOR UR AUTO: ABNORMAL
CREAT UR-MCNC: 118.7 MG/DL (ref 15–325)
GLUCOSE UR QL STRIP: NEGATIVE
HGB UR QL STRIP: ABNORMAL
HYALINE CASTS UR QL AUTO: 0 /LPF (ref 0–1)
KETONES UR QL STRIP: NEGATIVE
LEUKOCYTE ESTERASE UR QL STRIP: ABNORMAL
MICROSCOPIC COMMENT: ABNORMAL
NITRITE UR QL STRIP: NEGATIVE
PH UR STRIP: 7 [PH]
PROT UR QL STRIP: ABNORMAL
PROT UR-MCNC: 29 MG/DL
PROT/CREAT UR: 0.24 MG/G{CREAT}
RBC #/AREA URNS AUTO: 17 /HPF (ref 0–4)
SP GR UR STRIP: 1.01
SQUAMOUS #/AREA URNS AUTO: 92 /HPF
UROBILINOGEN UR STRIP-ACNC: NEGATIVE EU/DL
WBC #/AREA URNS AUTO: >100 /HPF (ref 0–5)
WBC CLUMPS UR QL AUTO: ABNORMAL

## 2025-08-19 PROCEDURE — 82570 ASSAY OF URINE CREATININE: CPT

## 2025-08-19 PROCEDURE — 81003 URINALYSIS AUTO W/O SCOPE: CPT

## 2025-08-25 ENCOUNTER — OFFICE VISIT (OUTPATIENT)
Dept: NEPHROLOGY | Facility: CLINIC | Age: 35
End: 2025-08-25
Payer: MEDICAID

## 2025-08-25 VITALS
HEART RATE: 86 BPM | DIASTOLIC BLOOD PRESSURE: 82 MMHG | HEIGHT: 60 IN | OXYGEN SATURATION: 97 % | BODY MASS INDEX: 30.75 KG/M2 | SYSTOLIC BLOOD PRESSURE: 116 MMHG | WEIGHT: 156.63 LBS | RESPIRATION RATE: 18 BRPM

## 2025-08-25 DIAGNOSIS — E83.59 NEPHROCALCINOSIS: Primary | ICD-10-CM

## 2025-08-25 DIAGNOSIS — N29 NEPHROCALCINOSIS: Primary | ICD-10-CM

## 2025-08-25 DIAGNOSIS — Z79.1 NSAID LONG-TERM USE: ICD-10-CM

## 2025-08-25 DIAGNOSIS — M54.50 RIGHT LOW BACK PAIN, UNSPECIFIED CHRONICITY, UNSPECIFIED WHETHER SCIATICA PRESENT: ICD-10-CM

## 2025-08-25 PROCEDURE — 3008F BODY MASS INDEX DOCD: CPT | Mod: CPTII,,, | Performed by: STUDENT IN AN ORGANIZED HEALTH CARE EDUCATION/TRAINING PROGRAM

## 2025-08-25 PROCEDURE — 1159F MED LIST DOCD IN RCRD: CPT | Mod: CPTII,,, | Performed by: STUDENT IN AN ORGANIZED HEALTH CARE EDUCATION/TRAINING PROGRAM

## 2025-08-25 PROCEDURE — 99999 PR PBB SHADOW E&M-EST. PATIENT-LVL III: CPT | Mod: PBBFAC,,, | Performed by: STUDENT IN AN ORGANIZED HEALTH CARE EDUCATION/TRAINING PROGRAM

## 2025-08-25 PROCEDURE — G2211 COMPLEX E/M VISIT ADD ON: HCPCS | Mod: S$PBB,,, | Performed by: STUDENT IN AN ORGANIZED HEALTH CARE EDUCATION/TRAINING PROGRAM

## 2025-08-25 PROCEDURE — 99214 OFFICE O/P EST MOD 30 MIN: CPT | Mod: S$PBB,,, | Performed by: STUDENT IN AN ORGANIZED HEALTH CARE EDUCATION/TRAINING PROGRAM

## 2025-08-25 PROCEDURE — 3066F NEPHROPATHY DOC TX: CPT | Mod: CPTII,,, | Performed by: STUDENT IN AN ORGANIZED HEALTH CARE EDUCATION/TRAINING PROGRAM

## 2025-08-25 PROCEDURE — 3079F DIAST BP 80-89 MM HG: CPT | Mod: CPTII,,, | Performed by: STUDENT IN AN ORGANIZED HEALTH CARE EDUCATION/TRAINING PROGRAM

## 2025-08-25 PROCEDURE — 99213 OFFICE O/P EST LOW 20 MIN: CPT | Mod: PBBFAC | Performed by: STUDENT IN AN ORGANIZED HEALTH CARE EDUCATION/TRAINING PROGRAM

## 2025-08-25 PROCEDURE — 3074F SYST BP LT 130 MM HG: CPT | Mod: CPTII,,, | Performed by: STUDENT IN AN ORGANIZED HEALTH CARE EDUCATION/TRAINING PROGRAM

## 2025-09-03 ENCOUNTER — TELEPHONE (OUTPATIENT)
Facility: CLINIC | Age: 35
End: 2025-09-03
Payer: MEDICAID